# Patient Record
Sex: FEMALE | Race: WHITE | NOT HISPANIC OR LATINO | Employment: STUDENT | URBAN - METROPOLITAN AREA
[De-identification: names, ages, dates, MRNs, and addresses within clinical notes are randomized per-mention and may not be internally consistent; named-entity substitution may affect disease eponyms.]

---
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
## 2023-01-03 ENCOUNTER — TELEPHONE (OUTPATIENT)
Dept: NEUROLOGY | Facility: CLINIC | Age: 18
End: 2023-01-03

## 2023-01-03 DIAGNOSIS — E27.0 HYPERCORTISOLEMIA (H): Primary | ICD-10-CM

## 2023-01-03 NOTE — TELEPHONE ENCOUNTER
----- Message -----  From: Daisy Lucero MD  Sent: 1/2/2023  12:49 PM CST  To: Tory Rivera MD, #  Subject: IPSS referral                                    Happy new year Dr. Rivera and Mary Jo,      This is the case with 17 F, partially biochemical positive, and who rencently went to 7 Rach images.   I discussed with her, she and her family wants to pursue IPSS.    Thank you very much    Daisy

## 2023-01-04 NOTE — TELEPHONE ENCOUNTER
Spoke with patient. She would like to schedule procedure however she would like a consult with Dr. Rivera prior. Phone call with Dr. Rivera scheduled for 1/18/23 at 1:30. Will have scheduling reach out to arrange PSS. Patient verbalized understanding. Contact information provided and encouraged to call with questions/concerns. Mary Jo Magana RN 1/4/2023 1:30 PM

## 2023-01-05 ENCOUNTER — TELEPHONE (OUTPATIENT)
Dept: NEUROSURGERY | Facility: CLINIC | Age: 18
End: 2023-01-05

## 2023-01-05 NOTE — TELEPHONE ENCOUNTER
Called patient to schedule surgery with Dr. Rivera    Date of Surgery: 2/15    Location of surgery: [facility]    Pre-Op H&P: IV CONSCIOUS    Pre/Post Imaging:  No    Additional comments: Spoke with patient, they are aware of above dates, will reach out with any questions and await instruction from RN Anna C. Schoenecker on 1/5/2023 at 10:57 AM

## 2023-01-16 NOTE — TELEPHONE ENCOUNTER
[facility] Call Center    Phone Message    May a detailed message be left on voicemail: yes     Reason for Call: Other: Patient said she is supposed to have an appt with Dr. Rivera on 1/18. Writer does not see any appt scheduled.Please contact patient to advise.     Action Taken: Message routed to:  [facility] neurosurgery    Travel Screening: Not Applicable

## 2023-01-17 NOTE — TELEPHONE ENCOUNTER
FUTURE VISIT INFORMATION      FUTURE VISIT INFORMATION:    Date: 1/18/2023    Time: 130pm    Location: [facility]  REFERRAL INFORMATION:    Referring provider:  Dr. Lucero    Referring providers clinic:  [facility]    Reason for visit/diagnosis  Hypercortisolemia     RECORDS REQUESTED FROM:       Clinic name Comments Records Status Imaging Status   Internal Dr. Lcuero-11/11/2022    MR Pituitary-12/12/2022 Epic PACS

## 2023-01-18 ENCOUNTER — PRE VISIT (OUTPATIENT)
Dept: NEUROSURGERY | Facility: CLINIC | Age: 18
End: 2023-01-18

## 2023-01-18 ENCOUNTER — VIRTUAL VISIT (OUTPATIENT)
Dept: NEUROSURGERY | Facility: CLINIC | Age: 18
End: 2023-01-18
Payer: COMMERCIAL

## 2023-01-18 DIAGNOSIS — E27.0 HYPERCORTISOLEMIA (H): Primary | ICD-10-CM

## 2023-01-18 PROCEDURE — 99204 OFFICE O/P NEW MOD 45 MIN: CPT | Mod: 95 | Performed by: NEUROLOGICAL SURGERY

## 2023-01-18 ASSESSMENT — PATIENT HEALTH QUESTIONNAIRE - PHQ9: SUM OF ALL RESPONSES TO PHQ QUESTIONS 1-9: 12

## 2023-01-18 NOTE — Clinical Note
January 18, 2023       TO: Eloisa Rene  [address] [place] [facility] [address]       DearMs.,    We are writing to inform you of your test results.    {ump results letter list:[id number]}    No results found from the In Basket message.    ***

## 2023-01-18 NOTE — LETTER
1/18/2023      RE: Eloisa Rene  [address]  [place] [address]       Eloisa is a 17 year old who is being evaluated via a billable telephone visit.      What phone number would you like to be contacted at? [phone]   How would you like to obtain your AVS? [place]    Distant Location (provider location):  On-site  Phone call duration: 30 minutes    At [employer], mostly online. On the business school track. Brother has Down syndrome. Right handed.     Father was adopted; maternal grandfather with salivary gland cancer    Active until [employer] year of high school. Began with vertigo.  Urinary frequency in senior year.  First year of college - felt ill, malaise; April 2022 - noticeable symptoms    1. Tremulousness, diffuse, feels like she is vibrating inside  2. Rigors  3. Exercise intolerance - fatigue  4. Feels excessively hot  5. Drooping right side face, intermittent  6. Nearly 30 lb weight gain since April (increased truncal obesity with stretch marks)  7. Dysmenorrhea, chronic  8. Headaches, posterior neck, head is sensitive to touch, daily, radiates to temporal regions  9. Poor satiety  10. Multiple skin lesions that don't heal    See dictated note.    MD Tory Hunter MD

## 2023-01-18 NOTE — PROGRESS NOTES
Eloisa is a 17 year old who is being evaluated via a billable telephone visit.      What phone number would you like to be contacted at? [phone]   How would you like to obtain your AVS? Jigna    Distant Location (provider location):  On-site  Phone call duration: 30 minutes    At [employer], mostly online. On the business school track. Brother has Down syndrome. Right handed.     Father was adopted; maternal grandfather with salivary gland cancer    Active until [employer] year of high school. Began with vertigo.  Urinary frequency in senior year.  First year of college - felt ill, malaise; April 2022 - noticeable symptoms    1. Tremulousness, diffuse, feels like she is vibrating inside  2. Rigors  3. Exercise intolerance - fatigue  4. Feels excessively hot  5. Drooping right side face, intermittent  6. Nearly 30 lb weight gain since April (increased truncal obesity with stretch marks)  7. Dysmenorrhea, chronic  8. Headaches, posterior neck, head is sensitive to touch, daily, radiates to temporal regions  9. Poor satiety  10. Multiple skin lesions that don't heal    See dictated note.    MAGUI Rivera MD

## 2023-01-18 NOTE — PATIENT INSTRUCTIONS
"    Problem List Items Addressed This Visit        Musculoskeletal and Integumentary    Closed displaced fracture of proximal phalanx of lesser toe of left foot with routine healing     Fourth and fifth toe at the proximal phalanx.  Does work a job with a lot of walking, pushing and pulling.  Will continue work restrictions.  Continue to use hard soled shoe and jac taping.  Pain relief to the use of Tylenol and ibuprofen discussed.  Follow-up in 1 week for recheck and x-rays to determine healing given the mild displacement of the fourth toe fracture           Other Visit Diagnoses     Closed fracture of fourth toe of left foot with routine healing    -  Primary        Return in about 1 week (around 12/15/2021) for fracture follow up.    Subjective   Jordan is a 34 year old who presents for the following health issues   In her toes last week while playing with kids.  Not work-related.  However works a job in which he does a lot of walking, as well as lifting and bending and use of foot.  He thought after 3 or 4 days to build return to work however he is finding any walking causes pain.  Due to the nature of his job and how he does need to move quickly he does not feel he will be able to do his job successfully.  Reviewed previous x-rays.  Pain is staying under good control.  He is using a hard soled shoe and jac taping.  No swelling.  Bruising is still present but improving.    History of Present Illness       He eats 2-3 servings of fruits and vegetables daily.He consumes 1 sweetened beverage(s) daily.He exercises with enough effort to increase his heart rate 30 to 60 minutes per day.  He exercises with enough effort to increase his heart rate 4 days per week.   He is taking medications regularly.       Review of Systems   All other systems reviewed and are negative.           Objective    /88   Pulse 68   Temp 98.1  F (36.7  C) (Oral)   Resp 12   Ht 1.727 m (5' 8\")   Wt 103 kg (227 lb)   BMI 34.52 " Petrosal sinus sampling already scheduled   kg/m    Body mass index is 34.52 kg/m .  Physical Exam  Vitals and nursing note reviewed.   Constitutional:       General: He is not in acute distress.     Appearance: Normal appearance. He is not ill-appearing.   HENT:      Head: Normocephalic and atraumatic.   Eyes:      Extraocular Movements: Extraocular movements intact.      Conjunctiva/sclera: Conjunctivae normal.   Pulmonary:      Effort: Pulmonary effort is normal.   Skin:     Capillary Refill: Capillary refill takes less than 2 seconds.      Comments: Mild ecchymosis in the forefoot that is resolving.  Mild edema of the fourth and fifth toe.  Sensation intact.   Neurological:      Mental Status: He is alert and oriented to person, place, and time.   Psychiatric:         Attention and Perception: Attention normal.         Mood and Affect: Mood normal.         Speech: Speech normal.         Thought Content: Thought content normal.

## 2023-01-18 NOTE — LETTER
1/18/2023       RE: Eloisa Rene  [address]  [facility] [address]     Dear Colleague,    Thank you for referring your patient, Eloisa Rene, to the [facility] NEUROSURGERY CLINIC [place] at [facility]. Please see a copy of my visit note below.    Eloisa is a 17 year old who is being evaluated via a billable telephone visit.      What phone number would you like to be contacted at? [phone]   How would you like to obtain your AVS? MyChart    Distant Location (provider location):  On-site  Phone call duration: 30 minutes    At [employer] [facility], mostly online. On the business school track. Brother has Down syndrome. Right handed.     Father was adopted; maternal grandfather with salivary gland cancer    Active until [employer] year of high school. Began with vertigo.  Urinary frequency in senior year.  First year of college - felt ill, malaise; April 2022 - noticeable symptoms    1. Tremulousness, diffuse, feels like she is vibrating inside  2. Rigors  3. Exercise intolerance - fatigue  4. Feels excessively hot  5. Drooping right side face, intermittent  6. Nearly 30 lb weight gain since April (increased truncal obesity with stretch marks)  7. Dysmenorrhea, chronic  8. Headaches, posterior neck, head is sensitive to touch, daily, radiates to temporal regions  9. Poor satiety  10. Multiple skin lesions that don't heal    See dictated note.    MAGUI Rivera MD            Again, thank you for allowing me to participate in the care of your patient.      Sincerely,    Tory Rivera MD

## 2023-01-18 NOTE — LETTER
1/18/2023       RE: Eloisa Rene  [address]  [place] [address]     Dear Colleague,    Thank you for referring your patient, Eloisa Rene, to the [facility] NEUROSURGERY CLINIC [place] at [facility]. Please see a copy of my visit note below.    At [employer], mostly online. On the business school track. Brother has Down syndrome. Right handed.     Father was adopted; maternal grandfather with salivary gland cancer    Active until [employer] year of high school. Began with vertigo.  Urinary frequency in senior year.  First year of college - felt ill, malaise; April 2022 - noticeable symptoms    1. Tremulousness, diffuse, feels like she is vibrating inside  2. Rigors  3. Exercise intolerance - fatigue  4. Feels excessively hot  5. Drooping right side face, intermittent  6. Nearly 30 lb weight gain since April (increased truncal obesity with stretch marks)  7. Dysmenorrhea, chronic  8. Headaches, posterior neck, head is sensitive to touch, daily, radiates to temporal regions  9. Poor satiety  10. Multiple skin lesions that don't heal          Sincerely,    Tory Rivera MD

## 2023-01-23 ENCOUNTER — PATIENT OUTREACH (OUTPATIENT)
Dept: NEUROLOGY | Facility: CLINIC | Age: 18
End: 2023-01-23
Payer: COMMERCIAL

## 2023-01-23 NOTE — PATIENT INSTRUCTIONS
You are scheduled for a Diagnostic Cerebral Angiogram for Petrosal Sinus Sampling with Dr. Rivera on 2/15/23. Arrival Time: 6:30 AM. Procedure Time: 8:00 AM.      Please follow these instructions:     * Check in at the [facility] at the [facility]. The address is [address] [place] [address]. The phone number is [phone].      * Nothing to eat for 8 hours prior to arrival time. You may drink clear liquids (includes water, Jell-O, clear broth, apple juice or any non-carbonated beverage that you can see through) up until 2 hours prior to arrival time.       * You may take your medications with a sip of water the morning of the procedure.     * You will be discharged the same day. You must have a  home and someone that can stay with you through the night.     PLEASE NOTE our COVID-19 visitors policy: For the protection of our patients and visitors, patients are allowed two consistent visitors, 5 years of age or older, per adult patient in the hospital. Visitors must wear a mask at all times while in the hospital.     All discharge instructions will be given to the  or volunteer. Documentation for the post-operative plan will be given to the patient and . Patients are required to have someone to stay with them for 24 hours after their procedure.    If you have questions regarding your procedure, please contact me at [phone], option 1.    If you need to cancel, reschedule or have procedure scheduling related questions, please call Miranda at [phone].    Thank you,  Mary Jo Magana, RN, CNRN, SCRN  Stroke & Neuroendovascular Care Coordinator

## 2023-01-23 NOTE — PROGRESS NOTES
Informed patient of procedure instructions. Confirmed date and time. Patient verbalized understanding. All questions answered. Patient instructions sent via [employer]. Patient has my contact information and was encouraged to call with questions/concerns. Mary Jo Magana RN 1/23/2023 1:37 PM

## 2023-02-06 NOTE — PROGRESS NOTES
Service Date: 2023    Liane Smith DO  [address] [address]    RE:  Eloisa Rene  MRN: [id number]  : 2005    Dear Dr. Smith:      We spoke to Miss Rene as part of a telephone visit in [facility] on 2023.  At the request of our partner from Pituitary Endocrinology, Dr. Lucero.  I will refer you to her notes for additional details as well as the notes from Dr. Childs.  Dr. Childs has evaluated Miss Rene for multiple symptoms and identified hypercortisolemia.  She has undergone workup for Cushing disease.  Miss Rene was accompanied on this telephone visit by her parents.      In summary, she is a 17-year-old right-handed woman who has had multiple symptoms in recent years.  In retrospect, some symptoms began in the [employer] year of high school with vertigo.  She then developed urinary frequency in her senior year.  During her first year of college, she generally felt ill with malaise.  In 2022, she developed numerous discrete symptoms besides the generalized malaise.  She described many of the symptoms and these are outlined in Dr. Childs' and Dr. Lucero's notes.    Brief summary includes diffuse tremulousness (as if she is internally vibrating), exercise intolerance/fatigue, feeling excessively warm, intermittent right facial weakness (not observed by others), a 30-pound weight gain since 2022, truncal obesity and stretch marks, posterior cervical and occipital headaches that radiate to the bitemporal regions, poor satiety and multiple nonhealing skin lesions.  In addition, she has had chronic dysmenorrhea.      Some of her biochemical workup revealed elevated ACTH levels (178) 1 mg dexamethasone suppression test resulted in elevated cortisol.  She underwent the 8 mg suppression test, which appropriately suppressed at 0.61.  Her 24-hour urinary free cortisol was mildly elevated in 10/2022.  Her prolactin level is mildly elevated in the high 30s.    Her MRI  "has not shown an obvious pituitary tumor.    In summary, she has some findings that are suspicious for Cushing disease, but does not have fully conclusive biochemical profile.    PAST MEDICAL HISTORY:  Includes depression and anxiety.    FAMILY HISTORY:  Her maternal grandfather had salivary gland cancer.  Her father was adopted, so there is no known medical history on the paternal side.  Her brother has Down syndrome.    SOCIAL HISTORY:  She currently attends [employer].  She is completing mostly online course work and she is on the business school track.  She does not smoke or drink alcohol.      Over the phone, she sounded a bit anxious.  Otherwise, her speech, language and phonation are normal.    REVIEW OF STUDIES:  We went over her MRI from 12/12/2022.  This was performed on the 7 Rach unit.  There is a hypoenhancing focus on the right side of the sella adjacent to the parasellar space.  However, this is seen only on one slice and could very well represent a signal averaging from the surrounding sphenoid sinus or the parasellar space.  There are no discrete lesions seen in the sella.    IMPRESSION AND PLAN:  Given the inconclusive biochemical profile for her hypercortisolemia, we agree with Dr. Lucero that inferior petrosal sinus sampling is reasonable.      The majority of this 30-minute visit was spent discussing details of the procedure as well as defining the terms \"Cushing syndrome\" and \"Cushing disease\" also.  We went over the petrosal sinus sampling as well as the extremely small risks of groin hematomas, venous injury and stroke.  She seemed to have good understanding of all this and agrees to proceed.  We have her scheduled for the procedure in mid-February.  We will keep you informed of her progress.  Please do not hesitate to contact us with questions.    Sincerely,    Tory Rivera MD        D: 02/06/2023   T: 02/06/2023   MT: JAYASHREE    Name:     ELVA NELSON  MRN:      " [id number]        Account:      [id number]   :      2005           Service Date: 2023       Document: [id number]

## 2023-02-10 ENCOUNTER — TELEPHONE (OUTPATIENT)
Dept: NEUROSURGERY | Facility: CLINIC | Age: 18
End: 2023-02-10
Payer: COMMERCIAL

## 2023-02-10 NOTE — TELEPHONE ENCOUNTER
[facility] Call Center    Phone Message    May a detailed message be left on voicemail: yes     Reason for Call: Other: Patient called stating she has a question about her upcoming procedure 2/15/2023. Patient states she is menstruating for the next 4-5 days which she normally takes advil for, patient wonders how long it will need to be stopped for before her procedure to prevent an interaction with any of the other medications.    Action Taken: Message routed to:  [facility] Neurosurgery    Travel Screening: Not Applicable

## 2023-02-10 NOTE — TELEPHONE ENCOUNTER
Informed patient she does not need to discontinue ibuprofen for procedure. Patient verbalized understanding. Mary Jo Magana RN 2/10/2023 2:54 PM

## 2023-02-15 ENCOUNTER — APPOINTMENT (OUTPATIENT)
Dept: MEDSURG UNIT | Facility: CLINIC | Age: 18
End: 2023-02-15
Attending: NEUROLOGICAL SURGERY
Payer: COMMERCIAL

## 2023-02-15 ENCOUNTER — APPOINTMENT (OUTPATIENT)
Dept: INTERVENTIONAL RADIOLOGY/VASCULAR | Facility: CLINIC | Age: 18
End: 2023-02-15
Attending: NEUROLOGICAL SURGERY
Payer: COMMERCIAL

## 2023-02-15 ENCOUNTER — HOSPITAL ENCOUNTER (OUTPATIENT)
Facility: CLINIC | Age: 18
Discharge: HOME | End: 2023-02-15
Attending: NEUROLOGICAL SURGERY | Admitting: NEUROLOGICAL SURGERY
Payer: COMMERCIAL

## 2023-02-15 VITALS
HEART RATE: 96 BPM | OXYGEN SATURATION: 98 % | SYSTOLIC BLOOD PRESSURE: 105 MMHG | BODY MASS INDEX: 29.28 KG/M2 | DIASTOLIC BLOOD PRESSURE: 66 MMHG | TEMPERATURE: 98.5 F | RESPIRATION RATE: 14 BRPM | WEIGHT: 202.6 LBS

## 2023-02-15 DIAGNOSIS — E27.0 HYPERCORTISOLEMIA (H): ICD-10-CM

## 2023-02-15 LAB
ANION GAP SERPL CALCULATED.3IONS-SCNC: 14 MMOL/L (ref 7–15)
APTT PPP: 26 SECONDS (ref 22–38)
BUN SERPL-MCNC: 13.8 MG/DL (ref 6–20)
CALCIUM SERPL-MCNC: 9.1 MG/DL (ref 8.6–10)
CHLORIDE SERPL-SCNC: 106 MMOL/L (ref 98–107)
CREAT SERPL-MCNC: 0.61 MG/DL (ref 0.51–0.95)
DEPRECATED HCO3 PLAS-SCNC: 24 MMOL/L (ref 22–29)
ERYTHROCYTE [DISTWIDTH] IN BLOOD BY AUTOMATED COUNT: 12.9 % (ref 10–15)
GFR SERPL CREATININE-BSD FRML MDRD: >90 ML/MIN/1.73M2
GLUCOSE SERPL-MCNC: 109 MG/DL (ref 70–99)
HCG UR QL: NEGATIVE
HCT VFR BLD AUTO: 42.6 % (ref 35–47)
HGB BLD-MCNC: 13.6 G/DL (ref 11.7–15.7)
INR PPP: 1.02 (ref 0.85–1.15)
MCH RBC QN AUTO: 28.6 PG (ref 26.5–33)
MCHC RBC AUTO-ENTMCNC: 31.9 G/DL (ref 31.5–36.5)
MCV RBC AUTO: 90 FL (ref 78–100)
PLATELET # BLD AUTO: 183 10E3/UL (ref 150–450)
POTASSIUM SERPL-SCNC: 4.3 MMOL/L (ref 3.4–5.3)
RBC # BLD AUTO: 4.75 10E6/UL (ref 3.8–5.2)
SODIUM SERPL-SCNC: 144 MMOL/L (ref 136–145)
WBC # BLD AUTO: 6.1 10E3/UL (ref 4–11)

## 2023-02-15 PROCEDURE — 82947 ASSAY GLUCOSE BLOOD QUANT: CPT | Performed by: STUDENT IN AN ORGANIZED HEALTH CARE EDUCATION/TRAINING PROGRAM

## 2023-02-15 PROCEDURE — 85730 THROMBOPLASTIN TIME PARTIAL: CPT | Performed by: STUDENT IN AN ORGANIZED HEALTH CARE EDUCATION/TRAINING PROGRAM

## 2023-02-15 PROCEDURE — 36415 COLL VENOUS BLD VENIPUNCTURE: CPT | Performed by: STUDENT IN AN ORGANIZED HEALTH CARE EDUCATION/TRAINING PROGRAM

## 2023-02-15 PROCEDURE — 99152 MOD SED SAME PHYS/QHP 5/>YRS: CPT

## 2023-02-15 PROCEDURE — 999N000132 HC STATISTIC PP CARE STAGE 1

## 2023-02-15 PROCEDURE — 250N000011 HC RX IP 250 OP 636: Performed by: NEUROLOGICAL SURGERY

## 2023-02-15 PROCEDURE — 255N000002 HC RX 255 OP 636: Performed by: NEUROLOGICAL SURGERY

## 2023-02-15 PROCEDURE — 272N000506 HC NEEDLE CR6

## 2023-02-15 PROCEDURE — 999N000134 HC STATISTIC PP CARE STAGE 3

## 2023-02-15 PROCEDURE — C1769 GUIDE WIRE: HCPCS

## 2023-02-15 PROCEDURE — 36500 INSERTION OF CATHETER VEIN: CPT | Mod: GC | Performed by: NEUROLOGICAL SURGERY

## 2023-02-15 PROCEDURE — C1887 CATHETER, GUIDING: HCPCS

## 2023-02-15 PROCEDURE — 36500 INSERTION OF CATHETER VEIN: CPT | Mod: LT

## 2023-02-15 PROCEDURE — 36012 PLACE CATHETER IN VEIN: CPT | Mod: 50

## 2023-02-15 PROCEDURE — 81025 URINE PREGNANCY TEST: CPT | Performed by: NEUROLOGICAL SURGERY

## 2023-02-15 PROCEDURE — 272N000197 HC ACCESSORY CR6

## 2023-02-15 PROCEDURE — 85610 PROTHROMBIN TIME: CPT | Performed by: STUDENT IN AN ORGANIZED HEALTH CARE EDUCATION/TRAINING PROGRAM

## 2023-02-15 PROCEDURE — 272N000566 HC SHEATH CR3

## 2023-02-15 PROCEDURE — 82024 ASSAY OF ACTH: CPT | Performed by: STUDENT IN AN ORGANIZED HEALTH CARE EDUCATION/TRAINING PROGRAM

## 2023-02-15 PROCEDURE — 75893 VENOUS SAMPLING BY CATHETER: CPT | Mod: 26 | Performed by: NEUROLOGICAL SURGERY

## 2023-02-15 PROCEDURE — 272N000192 HC ACCESSORY CR2

## 2023-02-15 PROCEDURE — 250N000009 HC RX 250: Performed by: STUDENT IN AN ORGANIZED HEALTH CARE EDUCATION/TRAINING PROGRAM

## 2023-02-15 PROCEDURE — 85027 COMPLETE CBC AUTOMATED: CPT | Performed by: STUDENT IN AN ORGANIZED HEALTH CARE EDUCATION/TRAINING PROGRAM

## 2023-02-15 PROCEDURE — 75893 VENOUS SAMPLING BY CATHETER: CPT | Mod: LT

## 2023-02-15 PROCEDURE — 36500 INSERTION OF CATHETER VEIN: CPT | Mod: XS | Performed by: NEUROLOGICAL SURGERY

## 2023-02-15 PROCEDURE — C1760 CLOSURE DEV, VASC: HCPCS

## 2023-02-15 PROCEDURE — 250N000011 HC RX IP 250 OP 636: Performed by: STUDENT IN AN ORGANIZED HEALTH CARE EDUCATION/TRAINING PROGRAM

## 2023-02-15 PROCEDURE — 36500 INSERTION OF CATHETER VEIN: CPT | Mod: RT

## 2023-02-15 PROCEDURE — 99152 MOD SED SAME PHYS/QHP 5/>YRS: CPT | Mod: GC | Performed by: NEUROLOGICAL SURGERY

## 2023-02-15 PROCEDURE — 258N000003 HC RX IP 258 OP 636: Performed by: STUDENT IN AN ORGANIZED HEALTH CARE EDUCATION/TRAINING PROGRAM

## 2023-02-15 RX ORDER — LIDOCAINE 40 MG/G
CREAM TOPICAL
Status: DISCONTINUED | OUTPATIENT
Start: 2023-02-15 | End: 2023-02-15 | Stop reason: HOSPADM

## 2023-02-15 RX ORDER — ONDANSETRON 4 MG/1
4 TABLET, ORALLY DISINTEGRATING ORAL EVERY 6 HOURS PRN
Status: DISCONTINUED | OUTPATIENT
Start: 2023-02-15 | End: 2023-02-15 | Stop reason: HOSPADM

## 2023-02-15 RX ORDER — SODIUM CHLORIDE 9 MG/ML
INJECTION, SOLUTION INTRAVENOUS CONTINUOUS
Status: DISCONTINUED | OUTPATIENT
Start: 2023-02-15 | End: 2023-02-15 | Stop reason: HOSPADM

## 2023-02-15 RX ORDER — FENTANYL CITRATE 50 UG/ML
25-50 INJECTION, SOLUTION INTRAMUSCULAR; INTRAVENOUS EVERY 5 MIN PRN
Status: DISCONTINUED | OUTPATIENT
Start: 2023-02-15 | End: 2023-02-15 | Stop reason: HOSPADM

## 2023-02-15 RX ORDER — HEPARIN SODIUM 200 [USP'U]/100ML
1 INJECTION, SOLUTION INTRAVENOUS CONTINUOUS PRN
Status: DISCONTINUED | OUTPATIENT
Start: 2023-02-15 | End: 2023-02-15 | Stop reason: HOSPADM

## 2023-02-15 RX ORDER — IBUPROFEN 200 MG
600 TABLET ORAL EVERY 6 HOURS PRN
COMMUNITY

## 2023-02-15 RX ORDER — FLUMAZENIL 0.1 MG/ML
0.2 INJECTION, SOLUTION INTRAVENOUS
Status: DISCONTINUED | OUTPATIENT
Start: 2023-02-15 | End: 2023-02-15 | Stop reason: HOSPADM

## 2023-02-15 RX ORDER — ONDANSETRON 2 MG/ML
4 INJECTION INTRAMUSCULAR; INTRAVENOUS EVERY 6 HOURS PRN
Status: DISCONTINUED | OUTPATIENT
Start: 2023-02-15 | End: 2023-02-15 | Stop reason: HOSPADM

## 2023-02-15 RX ORDER — HEPARIN SODIUM 1000 [USP'U]/ML
8400 INJECTION, SOLUTION INTRAVENOUS; SUBCUTANEOUS
Status: COMPLETED | OUTPATIENT
Start: 2023-02-15 | End: 2023-02-15

## 2023-02-15 RX ORDER — PROCHLORPERAZINE 25 MG
25 SUPPOSITORY, RECTAL RECTAL EVERY 12 HOURS PRN
Status: DISCONTINUED | OUTPATIENT
Start: 2023-02-15 | End: 2023-02-15 | Stop reason: HOSPADM

## 2023-02-15 RX ORDER — NALOXONE HYDROCHLORIDE 0.4 MG/ML
0.2 INJECTION, SOLUTION INTRAMUSCULAR; INTRAVENOUS; SUBCUTANEOUS
Status: DISCONTINUED | OUTPATIENT
Start: 2023-02-15 | End: 2023-02-15 | Stop reason: HOSPADM

## 2023-02-15 RX ORDER — HEPARIN SODIUM 1000 [USP'U]/ML
500-6000 INJECTION, SOLUTION INTRAVENOUS; SUBCUTANEOUS
Status: COMPLETED | OUTPATIENT
Start: 2023-02-15 | End: 2023-02-15

## 2023-02-15 RX ORDER — NALOXONE HYDROCHLORIDE 0.4 MG/ML
0.4 INJECTION, SOLUTION INTRAMUSCULAR; INTRAVENOUS; SUBCUTANEOUS
Status: DISCONTINUED | OUTPATIENT
Start: 2023-02-15 | End: 2023-02-15 | Stop reason: HOSPADM

## 2023-02-15 RX ORDER — IODIXANOL 320 MG/ML
100 INJECTION, SOLUTION INTRAVASCULAR ONCE
Status: COMPLETED | OUTPATIENT
Start: 2023-02-15 | End: 2023-02-15

## 2023-02-15 RX ORDER — PROCHLORPERAZINE MALEATE 10 MG
10 TABLET ORAL EVERY 6 HOURS PRN
Status: DISCONTINUED | OUTPATIENT
Start: 2023-02-15 | End: 2023-02-15 | Stop reason: HOSPADM

## 2023-02-15 RX ORDER — DESMOPRESSIN ACETATE 4 UG/ML
10 INJECTION, SOLUTION INTRAVENOUS; SUBCUTANEOUS ONCE
Status: COMPLETED | OUTPATIENT
Start: 2023-02-15 | End: 2023-02-15

## 2023-02-15 RX ORDER — PROTAMINE SULFATE 10 MG/ML
25 INJECTION, SOLUTION INTRAVENOUS ONCE
Status: COMPLETED | OUTPATIENT
Start: 2023-02-15 | End: 2023-02-15

## 2023-02-15 RX ADMIN — MIDAZOLAM HYDROCHLORIDE 0.5 MG: 1 INJECTION, SOLUTION INTRAMUSCULAR; INTRAVENOUS at 10:02

## 2023-02-15 RX ADMIN — FENTANYL CITRATE 25 MCG: 50 INJECTION, SOLUTION INTRAMUSCULAR; INTRAVENOUS at 11:01

## 2023-02-15 RX ADMIN — FENTANYL CITRATE 50 MCG: 50 INJECTION, SOLUTION INTRAMUSCULAR; INTRAVENOUS at 09:27

## 2023-02-15 RX ADMIN — HEPARIN SODIUM 5 BAG: 200 INJECTION, SOLUTION INTRAVENOUS at 09:40

## 2023-02-15 RX ADMIN — MIDAZOLAM HYDROCHLORIDE 1 MG: 1 INJECTION, SOLUTION INTRAMUSCULAR; INTRAVENOUS at 09:27

## 2023-02-15 RX ADMIN — FENTANYL CITRATE 25 MCG: 50 INJECTION, SOLUTION INTRAMUSCULAR; INTRAVENOUS at 10:26

## 2023-02-15 RX ADMIN — FENTANYL CITRATE 50 MCG: 50 INJECTION, SOLUTION INTRAMUSCULAR; INTRAVENOUS at 09:50

## 2023-02-15 RX ADMIN — FENTANYL CITRATE 25 MCG: 50 INJECTION, SOLUTION INTRAMUSCULAR; INTRAVENOUS at 09:36

## 2023-02-15 RX ADMIN — FENTANYL CITRATE 25 MCG: 50 INJECTION, SOLUTION INTRAMUSCULAR; INTRAVENOUS at 11:22

## 2023-02-15 RX ADMIN — FENTANYL CITRATE 25 MCG: 50 INJECTION, SOLUTION INTRAMUSCULAR; INTRAVENOUS at 10:38

## 2023-02-15 RX ADMIN — MIDAZOLAM HYDROCHLORIDE 0.5 MG: 1 INJECTION, SOLUTION INTRAMUSCULAR; INTRAVENOUS at 09:36

## 2023-02-15 RX ADMIN — FENTANYL CITRATE 25 MCG: 50 INJECTION, SOLUTION INTRAMUSCULAR; INTRAVENOUS at 09:44

## 2023-02-15 RX ADMIN — DESMOPRESSIN ACETATE 10 MCG: 4 INJECTION, SOLUTION INTRAVENOUS; SUBCUTANEOUS at 10:58

## 2023-02-15 RX ADMIN — MIDAZOLAM HYDROCHLORIDE 0.5 MG: 1 INJECTION, SOLUTION INTRAMUSCULAR; INTRAVENOUS at 10:26

## 2023-02-15 RX ADMIN — MIDAZOLAM HYDROCHLORIDE 0.5 MG: 1 INJECTION, SOLUTION INTRAMUSCULAR; INTRAVENOUS at 11:22

## 2023-02-15 RX ADMIN — PROTAMINE SULFATE 25 MG: 10 INJECTION, SOLUTION INTRAVENOUS at 11:34

## 2023-02-15 RX ADMIN — SODIUM CHLORIDE: 9 INJECTION, SOLUTION INTRAVENOUS at 07:32

## 2023-02-15 RX ADMIN — MIDAZOLAM HYDROCHLORIDE 0.5 MG: 1 INJECTION, SOLUTION INTRAMUSCULAR; INTRAVENOUS at 09:44

## 2023-02-15 RX ADMIN — FENTANYL CITRATE 25 MCG: 50 INJECTION, SOLUTION INTRAMUSCULAR; INTRAVENOUS at 10:13

## 2023-02-15 RX ADMIN — FENTANYL CITRATE 25 MCG: 50 INJECTION, SOLUTION INTRAMUSCULAR; INTRAVENOUS at 10:01

## 2023-02-15 RX ADMIN — LIDOCAINE HYDROCHLORIDE 35 ML: 10 INJECTION, SOLUTION EPIDURAL; INFILTRATION; INTRACAUDAL; PERINEURAL at 09:46

## 2023-02-15 RX ADMIN — HEPARIN SODIUM 8400 UNITS: 1000 INJECTION INTRAVENOUS; SUBCUTANEOUS at 10:11

## 2023-02-15 RX ADMIN — MIDAZOLAM HYDROCHLORIDE 0.5 MG: 1 INJECTION, SOLUTION INTRAMUSCULAR; INTRAVENOUS at 10:13

## 2023-02-15 RX ADMIN — MIDAZOLAM HYDROCHLORIDE 0.5 MG: 1 INJECTION, SOLUTION INTRAMUSCULAR; INTRAVENOUS at 10:39

## 2023-02-15 RX ADMIN — HEPARIN SODIUM 500 UNITS: 1000 INJECTION INTRAVENOUS; SUBCUTANEOUS at 11:11

## 2023-02-15 RX ADMIN — MIDAZOLAM HYDROCHLORIDE 1 MG: 1 INJECTION, SOLUTION INTRAMUSCULAR; INTRAVENOUS at 09:50

## 2023-02-15 RX ADMIN — MIDAZOLAM HYDROCHLORIDE 0.5 MG: 1 INJECTION, SOLUTION INTRAMUSCULAR; INTRAVENOUS at 11:01

## 2023-02-15 RX ADMIN — IODIXANOL 65 ML: 320 INJECTION, SOLUTION INTRAVASCULAR at 11:37

## 2023-02-15 ASSESSMENT — ACTIVITIES OF DAILY LIVING (ADL)
ADLS_ACUITY_SCORE: 35

## 2023-02-15 ASSESSMENT — VISUAL ACUITY
OU: NORMAL ACUITY
OU: NORMAL ACUITY

## 2023-02-15 NOTE — PROGRESS NOTES
[facility]     Endovascular Surgical Neuroradiology Pre-Procedure Note      HPI:  Eloisa Rene is a 17 year old Right Handed female with suspected Cushing syndrome presenting for further evaluation through Inferior Petrosal Sinus Sampling. MRI has not shown an obvious pituitary adenoma. However, some of the biochemical testing was equivocal. Including low and high dose dexamethasone supression testing.     She has a normal neurological examination. Denies any new complaints.     Medical History:  History reviewed. No pertinent past medical history.    Surgical History:  History reviewed. No pertinent surgical history.    Family History:  Family History   Problem Relation Age of Onset     Congenital Anomalies Brother         Down's Syndrome       Social History:  Social History     Socioeconomic History     Marital status: Single     Spouse name: Not on file     Number of children: Not on file     Years of education: Not on file     Highest education level: Not on file   Occupational History     Not on file   Tobacco Use     Smoking status: Never     Smokeless tobacco: Never   Substance and Sexual Activity     Alcohol use: Not on file     Drug use: Not on file     Sexual activity: Not on file   Other Topics Concern     Not on file   Social History Narrative     Not on file     Social Determinants of Health     Financial Resource Strain: Not on file   Food Insecurity: Not on file   Transportation Needs: Not on file   Physical Activity: Not on file   Stress: Not on file   Social Connections: Not on file   Intimate Partner Violence: Not on file   Housing Stability: Not on file       Allergies:  Allergies   Allergen Reactions     Adderall Other (See Comments)     She did not feel well while taking      Apri Unknown     Fluoxetine Other (See Comments)     Suicidal thoughts      Gianvi Other (See Comments)     Suicidal thoughts        Is there a contrast allergy?   No    Medications:  Medications Prior to Admission   Medication Sig Dispense Refill Last Dose     diazepam (VALIUM) 5 MG tablet Take 1 tablet (5 mg) by mouth once as needed for anxiety Take 1 tablet before MRI 2 tablet 1 More than a month     ibuprofen (ADVIL/MOTRIN) 200 MG tablet Take 600 mg by mouth every 6 hours as needed for pain   2/13/2023     VITAMIN D PO Take 1 tablet by mouth daily as needed   More than a month   .    ROS:  The 10 point Review of Systems is negative other than noted in the HPI or here.     PHYSICAL EXAMINATION  Vital Signs:  B/P: 126/85,  T: 98.5,  P: 94,  R: 16    Cardio:  RRR  Pulmonary:  no respiratory distress  Abdomen:  soft, non-tender    Neurologic  Mental Status:  fully alert, attentive and oriented, follows commands, speech clear and fluent  Cranial Nerves:  visual fields intact, PERRL, EOMI with normal smooth pursuit, facial sensation intact and symmetric, facial movements symmetric, hearing not formally tested but intact to conversation, palate elevation symmetric and uvula midline, no dysarthria, shoulder shrug strong bilaterally, tongue protrusion midline  Motor:  no abnormal movements, normal tone throughout, normal muscle bulk, no pronator drift, normal and symmetric rapid finger tapping, able to move all limbs spontaneously, strength 5/5 throughout upper and lower extremities  Sensory:  intact/symmetric to light touch and pin prick throughout upper and lower extremities  Coordination:  FNF and HS intact without dysmetria    Pre-procedure National Institutes of Health Stroke Scale:   Not applicable    LABS  (most recent Cr, BUN, GFR, PLT, INR, PTT within the past 7 days):  Recent Labs   Lab 02/15/23  0714   CR 0.61   BUN 13.8   GFRESTIMATED >90      INR 1.02   PTT 26        Platelet Function P2Y12 (PRU):  Not applicable      ASSESSMENT:    16 yo F with suspected Cushing's Syndrome presenting for IPSS     PLAN:  - IPSS      PRE-PROCEDURE SEDATION ASSESSMENT      Pre-Procedure Sedation Assessment done at 2100.    Expected Level:  Moderate Sedation    Indication:  Sedation is required to allow for neurointerventional procedure.    Consent obtained from patient after discussing the risks, benefits and alternatives.     PO Intake:  Appropriately NPO for procedure    ASA Class:  Class 1 - HEALTHY PATIENT    Mallampati:  Grade 1:  Soft palate, uvula, tonsillar pillars, and posterior pharyngeal wall visible    History and physical reviewed and no updates needed. I have reviewed the lab findings, diagnostic data, medications, and the plan for sedation. I have determined this patient to be an appropriate candidate for the planned sedation and procedure and have reassessed the patient IMMEDIATELY PRIOR to sedation and procedure.    Patient was discussed with the Attending, Dr. Rivera, who agrees with the plan.    Zachary Pagan MD, MPH  Neuroendovascular Surgery Fellow  [facility]  Pager: [phone]

## 2023-02-15 NOTE — IR NOTE
Patient Name: Eloisa Rene  Medical Record Number: [id number]  Today's Date: 2/15/2023    Procedure: inferior petrosal sinus sampling  Proceduralist: MD Pagan, MD Lopez, MD Rivera  Pathology present: N/A    Procedure Start: 0943  Procedure end: 1146  Sedation medications administered: 6 mg of versed, 300 mcg of fentanyl  Sedation time:  141 minutes (0094-7625)  Other: 8,400 units of IV heparin (1011), 10 mcg of IV desmopressin (1058), 500 units of IV heparin (1111), 25 mg of IV protamine sulfate (1134)    Report given to: Vanesa KAUFMAN RN  : N/A    Right groin site accessed at 0958; Right groin site de-accessed at 1140 using manual pressure for closure. Left groin site accessed at 1004. Left groin site de-accessed at 1138 using ExoSeal for closure - flat bedrest x 2 hours, till 1345.     Other Notes: Pt arrived to IR room 03 from . Consent reviewed. Pt denies any questions or concerns regarding procedure. Pt positioned supine and monitored per protocol. Pt tolerated procedure without any noted complications. Pt transferred back to .

## 2023-02-15 NOTE — DISCHARGE INSTRUCTIONS
[facility]         Interventional Radiology  Discharge Instructions Post Angiogram (NEURO)    AFTER YOU GO HOME  If you were given sedation, for the first 24 hours: we recommend that an adult stay with you, DO NOT drive or operate machinery at home or at work, DO NOT smoke, DO NOT make any important or legal decisions.  DO NOT drink alcoholic beverages the day of your procedure  DO relax and take it easy for 24 hours  DO drink plenty of fluids  DO resume your regular diet, unless otherwise instructed by your Primary Physician  DO NOT take a shower for at least 12 hours following your procedure. No tub bath, hot tubs, or swimming for 5 days. Do NOT scrub the procedure site vigorously for 5 days.      Care of groin site  It is normal to have a small bruise or lump at the site.  For the first 2 days, when you cough, sneeze or move your bowels, hold your hand over the puncture site and press gently.  Do NOT lift more than 10 pounds or do any strenuous exercise for at least 3 to 5 days.  Do not use lotion or powder near the puncture site for 3 days.  If you start bleeding from the site in your groin: lie down flat and press firmly on the site. Call your doctor as soon as you can.   Call 911 right away if you have: Heavy bleeding, bleeding that does not stop.    CALL THE PHYSICIAN IF:  You start bleeding from the procedure site.  You have numbness, coolness or change in color of the arm or leg of the puncture site  You experience changes in your vision, hearing, balance, coordination, speech, thinking or memory  You experience weakness in one or more extremity  You experience pain or redness at the puncture site  You develop nausea or vomiting  You develop hives or a rash or unexplained itching  You develop a temperature of 101 degrees F or greater      Stroke - Call 911    Remember: BE FAST       BALANCE--Sudden loss of balance or coordination. Example: trouble walking     EYES--Sudden problem seeing  out of one or both eyes     FACE--Sudden numbness or change to one side of the face. Examples: facial droop, uneven smile     ARM--Sudden numbness or weakness in one arm or leg     SPEECH--Sudden changes in speech or talking that doesn t make sense     TIME--if the person is having any of the symptoms above, CALL 911 immediately.      Symptoms may go away, then come back.     Sudden, worst headache of your life      [facility] INTERVENTIONAL RADIOLOGY DEPARTMENT  Procedure Physician:  Dr. Rivera, Dr. Pagan, and Dr. Lopez  Date of procedure: February 15, 2023  Telephone Numbers: [phone]      Monday-Friday 7:30 am to 4:00 pm  [phone] After 4:00 pm Monday-Friday, Weekends & Holidays.   Ask for the Neuro-Interventional doctor on call.  Someone is on call 24 hrs/day  [facility] toll free number: [phone] Monday-Friday 8:00 am to 4:30 pm  [facility] Emergency Dept: [phone]

## 2023-02-15 NOTE — PROGRESS NOTES
"Arrived to Unit [address] for cerebral angiogram procedure with petrosal sinus sampling done by Neuro[facility]. Tachycardic intially; stated feeling \"nervous.\" HR decreased to 90s. Denies pain. States feeling \"internal tremors.\" Body temperature hot; slightly diaphoretic all over. Pt states feeling very hot, requesting ice packs over entire body. No other neuro deficits. Labs resulting. DadRoyce, at bedside #[phone]. Pt stable; ready for consent.   "

## 2023-02-15 NOTE — DISCHARGE SUMMARY
Pt discharged to home. VSS on RA. Right and left groin access sites C/D/I, negative for a hematoma. Up ambulating and voiding w/o difficulty. PIV access removed. Tolerating PO intake. Discharge instructions reviewed and all questions answered. Pt escorted to the front entrance via wheelchair accompanied by RN and dad.

## 2023-02-15 NOTE — PROCEDURES
[facility]     Endovascular Surgical Neuroradiology Post-Procedure Note    Pre-Procedure Diagnosis:  Suspected cushings sydrome   Post-Procedure Diagnosis: Successful IPSS    Procedure(s):   Inferior petrosal sinus sampling    Findings:  As above     Plan:    [] Keep both legs flat x 3 hours       Primary Surgeon:  Dr. Tory Rivera  Secondary Surgeon:  Not applicable  Secondary Surgeon Review:  None  Fellow:  Latasha Lopez   Additional Assistants:  None     Prior to the start of the procedure and with procedural staff participation, I verbally confirmed: the patient s identity using two indicators, relevant allergies, that the procedure was appropriate and matched the consent or emergent situation, and that the correct equipment/implants were available. Immediately prior to starting the procedure I conducted the Time Out with the procedural staff and re-confirmed the patient s name, procedure, and site/side. (The Joint Commission universal protocol was followed.)  Yes    PRU value: Not applicable    Anesthesia:  Conscious Sedation  Medications:  Fentanyl, Midazolam  Puncture site:  RIGHT LEFT FEMORAL VEINS     Fluoroscopy time (minutes):  25.2  Radiation dose (mGy):  907.8    Contrast amount (mL):  40     Estimated blood loss (mL):  10    Closure:  Device    Disposition:  Home after recovery.        Sedation Post-Procedure Summary    Sedatives: Fentanyl and Midazolam (Versed)    Vital signs and pulse oximetry were monitored and remained stable throughout the procedure, and sedation was maintained until the procedure was complete.  The patient was monitored by staff until sedation discharge criteria were met.    Patient tolerance:  Patient tolerated the procedure well with no immediate complications.    Time of sedation in minutes:  60 minutes from beginning to end of physician one to one monitoring.    CHAVEZ LOPEZ MD  Pager:  [phone]

## 2023-02-15 NOTE — PROGRESS NOTES
Pt arrived back to 2A from procedure. VSS on RA, denies pain. Left groin site with exoseal in place and right groin with Tegaderm (manual pressure was held in IR). Tolerating water intake, denies food at this time. Pedal pulses are palpable. Two hour bedrest time; off bedrest at 1345.

## 2023-02-16 LAB
ACTH PLAS-MCNC: 10 PG/ML
ACTH PLAS-MCNC: 10 PG/ML
ACTH PLAS-MCNC: 11 PG/ML
ACTH PLAS-MCNC: 12 PG/ML
ACTH PLAS-MCNC: 13 PG/ML
ACTH PLAS-MCNC: 14 PG/ML
ACTH PLAS-MCNC: 15 PG/ML
ACTH PLAS-MCNC: 15 PG/ML
ACTH PLAS-MCNC: 22 PG/ML

## 2023-02-20 ENCOUNTER — TELEPHONE (OUTPATIENT)
Dept: NEUROSURGERY | Facility: CLINIC | Age: 18
End: 2023-02-20
Payer: COMMERCIAL

## 2023-02-21 NOTE — TELEPHONE ENCOUNTER
I spoke to Ms. King hayden and conveyed results of recent inferior petrosal sinus sampling (IPSS). She tolerated the procedure well overall. She has some parasthesias in the groin and lower extremities in a patchy distribution. No obvious hematoma or ecchymosis.     The IPSS shows convincingly that this patient does not have Cushing's disease. All ACTH values were well within reference range.    I explained that she will not need pituitary surgery and that continued medical management and workup is appropriate. Dr. Lucero will discuss next steps with her in the near future.    I asked her to contact us if the lower extremity symptoms do not improve.     MAGUI Rivera MD

## 2023-03-07 ENCOUNTER — LAB (OUTPATIENT)
Dept: LAB | Facility: CLINIC | Age: 18
End: 2023-03-07
Payer: COMMERCIAL

## 2023-03-07 DIAGNOSIS — E27.0 HYPERCORTISOLEMIA (H): ICD-10-CM

## 2023-03-07 LAB
SHBG SERPL-SCNC: 14 NMOL/L (ref 30–135)
SHBG SERPL-SCNC: 15 NMOL/L (ref 30–135)

## 2023-03-07 PROCEDURE — 84270 ASSAY OF SEX HORMONE GLOBUL: CPT

## 2023-03-07 PROCEDURE — 36415 COLL VENOUS BLD VENIPUNCTURE: CPT

## 2023-03-07 PROCEDURE — 84403 ASSAY OF TOTAL TESTOSTERONE: CPT

## 2023-03-07 PROCEDURE — 82627 DEHYDROEPIANDROSTERONE: CPT

## 2023-03-08 ENCOUNTER — TRANSFERRED RECORDS (OUTPATIENT)
Dept: HEALTH INFORMATION MANAGEMENT | Facility: CLINIC | Age: 18
End: 2023-03-08

## 2023-03-08 LAB — DHEA-S SERPL-MCNC: 188 UG/DL (ref 35–430)

## 2023-03-09 LAB
TESTOST FREE SERPL-MCNC: 0.79 NG/DL
TESTOST SERPL-MCNC: 30 NG/DL (ref 8–60)

## 2023-03-18 NOTE — RESULT ENCOUNTER NOTE
Dear Eloisa     Here is your results. I am sorry for the delay.   Your testosterone is broderline (normal but higher side), SHBG is low, meaning that we have some space to work on metabolic factor,   Suggestion is metformin 500 mg twice a day, Actos 15 mg daily, and spironolactone 50 mg twice a day. Could you let me know if you are interested in trying these medications.     Please call nursing line, if you are [facility] patient at [phone], if you are [facility] patient at [phone],  if you have any questions.    Please take care  Daisy Lucero MD

## 2023-03-21 ASSESSMENT — ENCOUNTER SYMPTOMS
BLOOD IN STOOL: 0
HYPOTENSION: 0
ABDOMINAL PAIN: 0
SYNCOPE: 0
SINUS CONGESTION: 1
NERVOUS/ANXIOUS: 1
JAUNDICE: 0
BACK PAIN: 1
DYSURIA: 1
INCREASED ENERGY: 1
DECREASED APPETITE: 0
SEIZURES: 0
ALTERED TEMPERATURE REGULATION: 1
EXERCISE INTOLERANCE: 1
FEVER: 0
COUGH: 0
EYE IRRITATION: 1
NAUSEA: 1
EYE PAIN: 0
PARALYSIS: 0
SMELL DISTURBANCE: 1
SKIN CHANGES: 1
HEARTBURN: 1
SPEECH CHANGE: 0
TASTE DISTURBANCE: 0
INSOMNIA: 1
POLYPHAGIA: 1
DIZZINESS: 1
WEAKNESS: 1
BOWEL INCONTINENCE: 0
DOUBLE VISION: 0
RECTAL PAIN: 0
HALLUCINATIONS: 0
DYSPNEA ON EXERTION: 1
MYALGIAS: 1
FLANK PAIN: 0
STIFFNESS: 1
NIGHT SWEATS: 1
MUSCLE CRAMPS: 1
SNORES LOUDLY: 0
NAIL CHANGES: 1
WEIGHT GAIN: 1
BRUISES/BLEEDS EASILY: 0
POSTURAL DYSPNEA: 1
SORE THROAT: 0
DIFFICULTY URINATING: 1
EYE REDNESS: 0
DECREASED LIBIDO: 1
DEPRESSION: 0
VOMITING: 0
NECK MASS: 0
NUMBNESS: 0
DISTURBANCES IN COORDINATION: 0
SWOLLEN GLANDS: 0
FATIGUE: 1
POLYDIPSIA: 0
HEMOPTYSIS: 0
EYE WATERING: 0
SLEEP DISTURBANCES DUE TO BREATHING: 0
HOARSE VOICE: 0
SHORTNESS OF BREATH: 1
DECREASED CONCENTRATION: 1
LOSS OF CONSCIOUSNESS: 0
MUSCLE WEAKNESS: 1
ORTHOPNEA: 1
WEIGHT LOSS: 0
HYPERTENSION: 0
PALPITATIONS: 1
NECK PAIN: 1
COUGH DISTURBING SLEEP: 0
LIGHT-HEADEDNESS: 1
TROUBLE SWALLOWING: 0
WHEEZING: 0
DIARRHEA: 0
TREMORS: 1
PANIC: 0
CONSTIPATION: 0
SINUS PAIN: 0
SPUTUM PRODUCTION: 0
POOR WOUND HEALING: 1
MEMORY LOSS: 0
CHILLS: 1
TINGLING: 1
JOINT SWELLING: 0
HEADACHES: 1
BLOATING: 1
HEMATURIA: 0
ARTHRALGIAS: 1
LEG PAIN: 1
HOT FLASHES: 1

## 2023-03-23 ENCOUNTER — TRANSFERRED RECORDS (OUTPATIENT)
Dept: HEALTH INFORMATION MANAGEMENT | Facility: CLINIC | Age: 18
End: 2023-03-23

## 2023-03-24 ENCOUNTER — OFFICE VISIT (OUTPATIENT)
Dept: ENDOCRINOLOGY | Facility: CLINIC | Age: 18
End: 2023-03-24
Payer: COMMERCIAL

## 2023-03-24 VITALS
BODY MASS INDEX: 29.33 KG/M2 | SYSTOLIC BLOOD PRESSURE: 144 MMHG | HEART RATE: 124 BPM | OXYGEN SATURATION: 100 % | WEIGHT: 204.9 LBS | HEIGHT: 70 IN | DIASTOLIC BLOOD PRESSURE: 85 MMHG | RESPIRATION RATE: 20 BRPM

## 2023-03-24 DIAGNOSIS — E24.0 PITUITARY DEPENDENT CUSHING DISEASE (H): ICD-10-CM

## 2023-03-24 DIAGNOSIS — D3A.8 NEUROENDOCRINE NEOPLASM OF LUNG (H): ICD-10-CM

## 2023-03-24 DIAGNOSIS — E27.0 HYPERCORTISOLEMIA (H): Primary | ICD-10-CM

## 2023-03-24 LAB
ALBUMIN SERPL-MCNC: 4.2 G/DL (ref 3.4–5)
ALP SERPL-CCNC: 124 U/L (ref 40–150)
ALT SERPL W P-5'-P-CCNC: 24 U/L (ref 0–50)
ANION GAP SERPL CALCULATED.3IONS-SCNC: 1 MMOL/L (ref 3–14)
AST SERPL W P-5'-P-CCNC: 14 U/L (ref 0–35)
BILIRUB SERPL-MCNC: 0.4 MG/DL (ref 0.2–1.3)
BUN SERPL-MCNC: 13 MG/DL (ref 7–30)
CALCIUM SERPL-MCNC: 9.5 MG/DL (ref 8.5–10.1)
CHLORIDE BLD-SCNC: 108 MMOL/L (ref 96–110)
CO2 SERPL-SCNC: 31 MMOL/L (ref 20–32)
CREAT SERPL-MCNC: 0.61 MG/DL (ref 0.5–1)
GFR SERPL CREATININE-BSD FRML MDRD: >90 ML/MIN/1.73M2
GLUCOSE BLD-MCNC: 103 MG/DL (ref 70–99)
PHOSPHATE SERPL-MCNC: 2.3 MG/DL (ref 2.5–4.5)
POTASSIUM BLD-SCNC: 4.2 MMOL/L (ref 3.4–5.3)
PROT SERPL-MCNC: 8 G/DL (ref 6.8–8.8)
SODIUM SERPL-SCNC: 140 MMOL/L (ref 133–144)

## 2023-03-24 PROCEDURE — 84146 ASSAY OF PROLACTIN: CPT | Performed by: INTERNAL MEDICINE

## 2023-03-24 PROCEDURE — 99000 SPECIMEN HANDLING OFFICE-LAB: CPT | Performed by: INTERNAL MEDICINE

## 2023-03-24 PROCEDURE — 82088 ASSAY OF ALDOSTERONE: CPT | Performed by: INTERNAL MEDICINE

## 2023-03-24 PROCEDURE — 84100 ASSAY OF PHOSPHORUS: CPT | Performed by: INTERNAL MEDICINE

## 2023-03-24 PROCEDURE — 80053 COMPREHEN METABOLIC PANEL: CPT | Performed by: INTERNAL MEDICINE

## 2023-03-24 PROCEDURE — 83835 ASSAY OF METANEPHRINES: CPT | Mod: 90 | Performed by: INTERNAL MEDICINE

## 2023-03-24 PROCEDURE — 82533 TOTAL CORTISOL: CPT | Performed by: INTERNAL MEDICINE

## 2023-03-24 PROCEDURE — 99215 OFFICE O/P EST HI 40 MIN: CPT | Performed by: INTERNAL MEDICINE

## 2023-03-24 PROCEDURE — 82024 ASSAY OF ACTH: CPT | Performed by: INTERNAL MEDICINE

## 2023-03-24 PROCEDURE — 36415 COLL VENOUS BLD VENIPUNCTURE: CPT | Performed by: INTERNAL MEDICINE

## 2023-03-24 PROCEDURE — 84244 ASSAY OF RENIN: CPT | Mod: 90 | Performed by: INTERNAL MEDICINE

## 2023-03-24 ASSESSMENT — PAIN SCALES - GENERAL: PAINLEVEL: MILD PAIN (2)

## 2023-03-24 NOTE — LETTER
3/24/2023         RE: Eloisa Rene  [address] [place] [address]        Dear Colleague,    Thank you for referring your patient, Eloisa Rene, to the [facility]. Please see a copy of my visit note below.                                                                               - Endocrinology Follow up -    Reason for visit/consult:  Elevated cortisol level    Primary care provider: Clinic, [facility]      Assessment and Plan  17 year old female with elevated cortisol level    There are some findings which suspect for Cushing's disease however still biochemically inconclusive. ACTH was 94 elevated in 11/2022 and never suppressed, so if exists, will be ACTH dependent.     MRI 7T done read as negative but may have some hypoenhanced lesion? Therefore we sent her to IPSS in 2/2023, IPSS showed no elevated of ACTH at all entire course. At this point, there is not enough evidence of ACTH dependent Cushing's disease. However she multiple symptoms including weakness, fatigue, dizziness, light sensitivity, anxiety, all of which significantly affecting her quality of life.     Meanwhile we did other tests to rule out PCOS, showed SHBG low, and slight high normal tesotsterone level    Today she also mentioned some possible lactation from her right nipple recently.    BP has been higher side today 140/90s.     - try metformin 500 mg BID    - rule out endocrine HTN  - metanephrine, renin, aldosterone    - will schedule DOTATATE scan    Repeat following in 6 month    - repeat UFC   - salivary cortisol test  - 1 mg Dex        Other non endocrine cause  Currently evaluated by [facility], she just had EMG, per patient and family they are considering autoimmune work up       RTC with me in 6 month       Total 45 minutes spent on the date of the encounter doing chart review, history and exam, documentation and further activities as noted above.        Daisy Lucero MD  Staff  Physician  Endocrinology and Metabolism  License: [id number]    Interval History as of 3/24/2023 : Patient has been not feeling well, she has multiple symptoms including weakness, fatigue, dizziness, light sensitivity, anxiety, all of which significantly affecting her quality of life. BP has been higher side. she also mentioned some possible lactation from her right nipple recently.  HPI: A 16 yo female here for the second opinion of hypercortisolemia. Referral from Dr. Childs.   Patient father accompanied the session.  Patient described since April 2022 she started to feel multiple weird symptoms.  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  She described she feel intermittently more, some vibration sensation inside of her body.  Also she mentioned some palpitations, especially when she is resting at night.  Symptom alleviated after multiple eating of salt and crackers.  She has polyuria started April 2022 she said approximately 4 times a night.  Also she mentioned she has vertigo quite often and she described she feels off balance.  Regarding her palpitation she also completed cardiology work-up in end of summer 2022 including 7 days monitoring and average heart rate was 87 and she was told possible sinus tachycardia without any remarkable arrhythmia.   She recalls since February 2021, she has repeated symptoms of urinary tract infection. Urine culture results 4 times and each time bacteria negative.   She also mentioned she experiences episode of right side of the eye and cheek drop but no numbness and the last few hours, she has been experience this episode approximately twice a week.   She also mentioned she had a significant weight gain since April 2022 she recalls her body weight is around 160s and today her body weight is 195 pounds.  She already visited multiple specialties primary doctor, cardiology,  "she was referred to endocrinologist Dr. Childs, where hypercholesterolemia work-up has been done intensively.   Random cortisol 10:00 in the morning was 29 with ACTH 178.  She underwent dexamethasone test 3 times.  First around 1 mg dexamethasone suppression test was 7.1 elevated, on October 17, 2022 she underwent 8 mg dexamethasone suppression test which was appropriately suppressed at 0.61.  She was repeated 1 mg dexamethasone suppression test on October 19, 2022 which shows borderline suppressed 2.24.  She underwent urinary free cortisol 24-hour test initially  October 10, 2022 was 40 with upper limit of 45, second test was done October 13, 2022 which has mildly elevated to 55.  She underwent salivary cortisol test to twice in the role first test was on November 2, 2022 and second test was November 30, 2022, she kept a 7 pound 12 reiterate her\" altogether and both of which normal range.   She also underwent brain MRI on October 28, 2022 which was read as normal no significant pituitary gland.   She also mentioned that her blood pressure has been mildly elevated recently and today 137/84 with pulse 82.  She also mentioned her glucose mildly elevated on October 6, 2022 fasting glucose was 102 .  Her prolactin level has been mildly elevated 30s-40.  Her menarche age 12 her menstrual cycle has been recently 28 to 35 days cycle she recently on birth control pills since July 2021 .  Denied hirsutism .  She also has depression and panic attack for 1.5 years but she described past several months get worse  .she came here for the second opinion today.   She also described her weight gain she noticed more abdominal fat accumulation.  She also mentioned difficult healing from minor injury.  She has some few acnes on her face.  She also mentioned she noticed some faint red stria around her stomach and axillary area.       Encounters  - from Last 3 Months    Past Medical/Surgical History:  No past medical history on " "file.  Past Surgical History:   Procedure Laterality Date     IR CAROTID CEREBRAL ANGIOGRAM BILATERAL  2/15/2023       Allergies:  Allergies   Allergen Reactions     Adderall Other (See Comments)     She did not feel well while taking      Apri Unknown     Fluoxetine Other (See Comments)     Suicidal thoughts      Gianvi Other (See Comments)     Suicidal thoughts        Current Medications   Current Outpatient Medications   Medication     ibuprofen (ADVIL/MOTRIN) 200 MG tablet     VITAMIN D PO     diazepam (VALIUM) 5 MG tablet     No current facility-administered medications for this visit.       Family History:  Family History   Problem Relation Age of Onset     Congenital Anomalies Brother         Down's Syndrome       Social History:  Social History     Tobacco Use     Smoking status: Never     Smokeless tobacco: Never   Substance Use Topics     Alcohol use: Not on file       ROS:  Full review of systems taken with the help of the intake sheet. Otherwise a complete 14 point review of systems was taken and is negative unless stated in the history above.      Physical Exam:   Vitals: BP (!) 144/85   Pulse (!) 124   Resp 20   Ht 1.772 m (5' 9.75\")   Wt 92.9 kg (204 lb 14.4 oz)   SpO2 100%   BMI 29.61 kg/m    BMI= Body mass index is 29.61 kg/m .   General: well appearing, no acute distress, pleasant and conversant,   Mental Status/neuro: alert and oriented  Face: symmetrical, pink facial color, no hirsutism  Eyes: anicteric,, no proptosis or lid lag  Resp: normally breathing  Thighs: thin purple stretch marks  Legs: no swelling or edema        Labs : I reviewed data from epic and extract and summarize the pertinent data here.   No results found for: NA   No results found for: POTASSIUM  No results found for: CHLORIDE  No results found for: RHIANNON  No results found for: CO2  No results found for: BUN  No results found for: CR  No results found for: GLC  No results found for: TSH  No results found for: T4  No " results found for: A1C    No results found for: IGF1  No results found for: LH  No results found for: FSH  No results found for: ESTROGEN  No results found for: PROLACTIN        MRI Brain: I personally reviewed the original images and agree with the below reports.   Reviewed.             Again, thank you for allowing me to participate in the care of your patient.        Sincerely,        Daisy Lucero MD

## 2023-03-24 NOTE — NURSING NOTE
"Eloisa Rene's goals for this visit include: See list with patient   She requests these members of her care team be copied on today's visit information: YES    PCP: Jessie Caro    Referring Provider:  No referring provider defined for this encounter.    BP (!) 144/85   Pulse (!) 124   Resp 20   Ht 1.772 m (5' 9.75\")   Wt 92.9 kg (204 lb 14.4 oz)   SpO2 100%   BMI 29.61 kg/m      Do you need any medication refills at today's visit? NONE    Sudheer Kim, EMT      "

## 2023-03-24 NOTE — PROGRESS NOTES
- Endocrinology Follow up -    Reason for visit/consult:  Elevated cortisol level    Primary care provider: [facility], [facility]      Assessment and Plan  17 year old female with elevated cortisol level    There are some findings which suspect for Cushing's disease however still biochemically inconclusive. ACTH was 94 elevated in 11/2022 and never suppressed, so if exists, will be ACTH dependent.     MRI 7T done read as negative but may have some hypoenhanced lesion? Therefore we sent her to IPSS in 2/2023, IPSS showed no elevated of ACTH at all entire course. At this point, there is not enough evidence of ACTH dependent Cushing's disease. However she multiple symptoms including weakness, fatigue, dizziness, light sensitivity, anxiety, all of which significantly affecting her quality of life.     Meanwhile we did other tests to rule out PCOS, showed SHBG low, and slight high normal tesotsterone level    Today she also mentioned some possible lactation from her right nipple recently.    BP has been higher side today 140/90s.     - try metformin 500 mg BID    - rule out endocrine HTN  - metanephrine, renin, aldosterone    - will schedule DOTATATE scan    Repeat following in 6 month    - repeat UFC   - salivary cortisol test  - 1 mg Dex        Other non endocrine cause  Currently evaluated by [facility], she just had EMG, per patient and family they are considering autoimmune work up       RTC with me in 6 month       Total 45 minutes spent on the date of the encounter doing chart review, history and exam, documentation and further activities as noted above.        Daisy Lucero MD  Staff Physician  Endocrinology and Metabolism  License: [id number]    Interval History as of 3/24/2023 : Patient has been not feeling well, she has multiple symptoms including weakness, fatigue, dizziness, light sensitivity, anxiety, all of which significantly  affecting her quality of life. BP has been higher side. she also mentioned some possible lactation from her right nipple recently.  HPI: A 18 yo female here for the second opinion of hypercortisolemia. Referral from Dr. Childs.   Patient father accompanied the session.  Patient described since April 2022 she started to feel multiple weird symptoms.  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  She described she feel intermittently more, some vibration sensation inside of her body.  Also she mentioned some palpitations, especially when she is resting at night.  Symptom alleviated after multiple eating of salt and crackers.  She has polyuria started April 2022 she said approximately 4 times a night.  Also she mentioned she has vertigo quite often and she described she feels off balance.  Regarding her palpitation she also completed cardiology work-up in end of summer 2022 including 7 days monitoring and average heart rate was 87 and she was told possible sinus tachycardia without any remarkable arrhythmia.   She recalls since February 2021, she has repeated symptoms of urinary tract infection. Urine culture results 4 times and each time bacteria negative.   She also mentioned she experiences episode of right side of the eye and cheek drop but no numbness and the last few hours, she has been experience this episode approximately twice a week.   She also mentioned she had a significant weight gain since April 2022 she recalls her body weight is around 160s and today her body weight is 195 pounds.  She already visited multiple specialties primary doctor, cardiology, she was referred to endocrinologist Dr. Childs, where hypercholesterolemia work-up has been done intensively.   Random cortisol 10:00 in the morning was 29 with ACTH 178.  She underwent dexamethasone test 3 times.  First around 1 mg dexamethasone  "suppression test was 7.1 elevated, on October 17, 2022 she underwent 8 mg dexamethasone suppression test which was appropriately suppressed at 0.61.  She was repeated 1 mg dexamethasone suppression test on October 19, 2022 which shows borderline suppressed 2.24.  She underwent urinary free cortisol 24-hour test initially  October 10, 2022 was 40 with upper limit of 45, second test was done October 13, 2022 which has mildly elevated to 55.  She underwent salivary cortisol test to twice in the role first test was on November 2, 2022 and second test was November 30, 2022, she kept a 7 pound 12 reiterate her\" altogether and both of which normal range.   She also underwent brain MRI on October 28, 2022 which was read as normal no significant pituitary gland.   She also mentioned that her blood pressure has been mildly elevated recently and today 137/84 with pulse 82.  She also mentioned her glucose mildly elevated on October 6, 2022 fasting glucose was 102 .  Her prolactin level has been mildly elevated 30s-40.  Her menarche age 12 her menstrual cycle has been recently 28 to 35 days cycle she recently on birth control pills since July 2021 .  Denied hirsutism .  She also has depression and panic attack for 1.5 years but she described past several months get worse  .she came here for the second opinion today.   She also described her weight gain she noticed more abdominal fat accumulation.  She also mentioned difficult healing from minor injury.  She has some few acnes on her face.  She also mentioned she noticed some faint red stria around her stomach and axillary area.       Encounters  - from Last 3 Months    Past Medical/Surgical History:  No past medical history on file.  Past Surgical History:   Procedure Laterality Date     IR CAROTID CEREBRAL ANGIOGRAM BILATERAL  2/15/2023       Allergies:  Allergies   Allergen Reactions     Adderall Other (See Comments)     She did not feel well while taking      Apri Unknown     " "Fluoxetine Other (See Comments)     Suicidal thoughts      Gianvi Other (See Comments)     Suicidal thoughts        Current Medications   Current Outpatient Medications   Medication     ibuprofen (ADVIL/MOTRIN) 200 MG tablet     VITAMIN D PO     diazepam (VALIUM) 5 MG tablet     No current facility-administered medications for this visit.       Family History:  Family History   Problem Relation Age of Onset     Congenital Anomalies Brother         Down's Syndrome       Social History:  Social History     Tobacco Use     Smoking status: Never     Smokeless tobacco: Never   Substance Use Topics     Alcohol use: Not on file       ROS:  Full review of systems taken with the help of the intake sheet. Otherwise a complete 14 point review of systems was taken and is negative unless stated in the history above.      Physical Exam:   Vitals: BP (!) 144/85   Pulse (!) 124   Resp 20   Ht 1.772 m (5' 9.75\")   Wt 92.9 kg (204 lb 14.4 oz)   SpO2 100%   BMI 29.61 kg/m    BMI= Body mass index is 29.61 kg/m .   General: well appearing, no acute distress, pleasant and conversant,   Mental Status/neuro: alert and oriented  Face: symmetrical, pink facial color, no hirsutism  Eyes: anicteric,, no proptosis or lid lag  Resp: normally breathing  Thighs: thin purple stretch marks  Legs: no swelling or edema        Labs : I reviewed data from epic and extract and summarize the pertinent data here.   No results found for: NA   No results found for: POTASSIUM  No results found for: CHLORIDE  No results found for: RHIANNON  No results found for: CO2  No results found for: BUN  No results found for: CR  No results found for: GLC  No results found for: TSH  No results found for: T4  No results found for: A1C    No results found for: IGF1  No results found for: LH  No results found for: FSH  No results found for: ESTROGEN  No results found for: PROLACTIN        MRI Brain: I personally reviewed the original images and agree with the below " reports.   Reviewed.

## 2023-03-25 LAB
CORTIS SERPL-MCNC: 9.8 UG/DL
PROLACTIN SERPL 3RD IS-MCNC: 17 NG/ML (ref 5–23)

## 2023-03-27 ENCOUNTER — TELEPHONE (OUTPATIENT)
Dept: ENDOCRINOLOGY | Facility: CLINIC | Age: 18
End: 2023-03-27
Payer: COMMERCIAL

## 2023-03-27 LAB
ACTH PLAS-MCNC: 30 PG/ML
ANNOTATION COMMENT IMP: NORMAL
METANEPHS SERPL-SCNC: 0.2 NMOL/L
NORMETANEPHRINE SERPL-SCNC: 0.49 NMOL/L
RENIN PLAS-CCNC: 1.6 NG/ML/HR

## 2023-03-27 NOTE — TELEPHONE ENCOUNTER
Left Voicemail (1st Attempt) for the patient to call back and schedule the following:      Return date: next opening   Specialty phone number: [phone]   Additional appointment(s) needed: pet scan     Ella goodson Procedure   Orthopedics, Podiatry, Sports Medicine, Ent ,Eye , Audiology, Adult Endocrine & Diabetes, Nutrition & Medication Therapy Management Specialties   [facility]

## 2023-03-28 LAB — ALDOST SERPL-MCNC: 4.1 NG/DL (ref 0–31)

## 2023-04-11 ENCOUNTER — TELEPHONE (OUTPATIENT)
Dept: ENDOCRINOLOGY | Facility: CLINIC | Age: 18
End: 2023-04-11
Payer: COMMERCIAL

## 2023-04-11 NOTE — TELEPHONE ENCOUNTER
[employer]: Cary dupree/ Yelena Case/Ref #: [id number]      For Patient's healthcare plan the Dotatate PET/CT w/ diagnostic CT CAP Peer to Peer can not have to be scheduled. Provider will need to call same number and transfer Dr Lucero/provider to provider hours are M-F 7a-7p.     Provider notified.   Deneen Chaparro RN on 2023 at 7:58 AM       Insurance: [facility] Vendor: [email or web address]   Phone #: [phone]   Due Date: 2023     Patient ID: [id number]   Case/Ref #: [id number]              RE    ----- Message from Daisy Lucero MD sent at 4/10/2023  7:02 PM CDT -----  Regarding: RE:  Dotatate PET/CT w/ diagnostic CT CAP denied- Peer/peer needed  Contact: [phone]  Thank you.      Olivia and Deneen,     Could you please help me to call insutance medical officer to get an appointment?   I am on call this week, to basically, any time/day works for me.     Thank you so much ! (Sorry I just sent duplicate message a few minutes ago)    Daisy  ----- Message -----  From: Janna Celestin  Sent: 4/10/2023   8:20 AM CDT  To: TIERA Timmons; #  Subject: RE:  Dotatate PET/CT w/ diagnostic CT CA#    Hello team,    I received a denial letter for this pt's PET. Looks like only the CT CAP. Was a peer/peer done? Should this be cancelled and rescheduled to CT CAP only?    Thank you,  Janna   ----- Message -----  From: Daisy Lucero MD  Sent: 2023   2:46 PM CDT  To: TIERA Timmons; Janna Celestin; #  Subject: RE:  Dotatate PET/CT w/ diagnostic CT CA#    Thank you Hannah Schwartz; please make an appoitment for tomorrow, (anytime fine)     Daisy  ----- Message -----  From: Janna Celestin  Sent: 2023  12:49 PM CDT  To: TIERA Timmons; #  Subject:  Dotatate PET/CT w/ diagnostic CT CAP de#    Insurance Denial Notification    Patient Name:  Eloisa Rene  :    2005  MRN:    [id number]    Dr. Lucero,    The authorization for procedure dotatate PET/CT w/  diagnostic CT chest, abdomen, pelvis  on date of service 4/18 has been denied by the patient's insurance for the following reason:    Denial Reason: patient does not have a pathologically proven diagnosis.     A [id number] review can be done by calling the insurance/third party authorization vendor with the following information:    Insurance: YUNG LOPEZ  Auth Vendor:  [employer]  Phone #: [phone]  Due Date: 4/7/2023    Patient ID: [id number]  Case/Ref #: [id number]           Please let us know how you wish to proceed as soon as possible. We will contact the patient after 1 business day.    Thank you,    Janna Stephen Prior Authorization

## 2023-04-12 ENCOUNTER — TELEPHONE (OUTPATIENT)
Dept: ENDOCRINOLOGY | Facility: CLINIC | Age: 18
End: 2023-04-12
Payer: COMMERCIAL

## 2023-04-12 ENCOUNTER — MYC MEDICAL ADVICE (OUTPATIENT)
Dept: ENDOCRINOLOGY | Facility: CLINIC | Age: 18
End: 2023-04-12
Payer: COMMERCIAL

## 2023-04-12 DIAGNOSIS — E24.9 HYPERCORTISOLISM (H): ICD-10-CM

## 2023-04-12 NOTE — TELEPHONE ENCOUNTER
RE: 4/18 Dotatate PET/CT w/ diagnostic CT CAP denied- Peer/peer needed  Received: Today  Daisy Lucero MD Humphrey, Bradley A, TIERA; Janna Celestin; Rupa Crockett RN; [facility] Triage-  Phone Number: [phone]     Yes, now we got approved!!     CT chest, CT abdo, PET (DOTATATE) all!     Reference approval number: [id number]   Valid 4/6-6/4     Thank you so much!!     Daisy

## 2023-04-12 NOTE — TELEPHONE ENCOUNTER
----- Message from Daisy Lucero MD sent at 4/12/2023 12:26 PM CDT -----  Regarding: RE: 4/18 Dotatate PET/CT w/ diagnostic CT CAP denied- Peer/peer needed  Contact: [phone]  Yes, now we got approved!!    CT chest, CT abdo, PET (DOTATATE) all!    Reference approval number: [id number]  Valid 4/6-6/4    Thank you so much!!    Daisy   ----- Message -----  From: Willy Matthews CNMT  Sent: 4/12/2023  11:11 AM CDT  To: Janna Celestin; Rupa Crockett RN; #  Subject: RE: 4/18 Dotatate PET/CT w/ diagnostic CT CA#    Hi,  I need to cancel by Friday afternoon 2pm, and I am out tomorrow and Friday, in [place].  IF you do not let me know by today, you will need to CALL MY CELL PHONE [phone] before Friday 2pm and let me know  I need to cancel the dose. If I do not hear from anyone, the dose will be delivered, and we will be responsible for it, about 4k. Thanks!  ----- Message -----  From: Janna Celestin  Sent: 4/12/2023   9:42 AM CDT  To: TIERA Timmons; #  Subject: RE: 4/18 Dotatate PET/CT w/ diagnostic CT CA#    Dejan Lucero,    I called [facility] and you don't have to set up an appointment. You call in and they will get you a provider to do a peer/peer with.      Let me know if you aren't able to call today because I think Benjamin Matthews has a time frame that he will need to cancel the dose of contrast.     Thank you,  Janna   ----- Message -----  From: Daisy Lucero MD  Sent: 4/10/2023   7:04 PM CDT  To: TIERA Timmons; Janna Celestin; #  Subject: RE: 4/18 Dotatate PET/CT w/ diagnostic CT CA#    Thank you.      Wesleye,     Could you please help [facility] an [facility]?   I am on call this week, to basically, any time/day works for me.     Thank you so much ! (Sorry I just sent duplicate message a few minutes ago)    Daisy  ----- Message -----  From: Janna Celestin  Sent: 4/10/2023   8:20 AM CDT  To: TIERA Timmons; #  Subject:  RE:  Dotatate PET/CT w/ diagnostic CT CA#    Hello team,    I received a denial letter for this pt's PET. Looks like only the CT CAP. Was a peer/peer done? Should this be cancelled and rescheduled to CT CAP only?    Thank you,  Janna   ----- Message -----  From: Daisy Lucero MD  Sent: 2023   2:46 PM CDT  To: TIERA Timmons; Janna Celestin; #  Subject: RE:  Dotatate PET/CT w/ diagnostic CT CA#    Thank you Benjamin and Janna Schwartz; please make an appoitment for tomorrow, (anytime fine)     Daisy  ----- Message -----  From: Janna Celestin  Sent: 2023  12:49 PM CDT  To: TIERA Timmons; #  Subject:  Dotatate PET/CT w/ diagnostic CT CAP de#    Insurance Denial Notification    Patient Name:  Eloisa Rene  :    2005  MRN:    [id number]    Dr. Lucero,    The authorization for procedure dotatate PET/CT w/ diagnostic CT chest, abdomen, pelvis  on date of service  has been denied by the patient's insurance for the following reason:    Denial Reason: patient does not have a pathologically proven diagnosis.     A ojnd-tx-cnvr review can be done by calling the insurance/third party authorization vendor with the following information:    Insurance: [facility]  Auth Vendor:  [employer]  Phone #: [phone]  Due Date: 2023    Patient ID: [id number]  Case/Ref #: [id number]           Please let us know how you wish to proceed as soon as possible. We will contact the patient after 1 business day.    Thank you,    Janna Stephen Prior Authorization

## 2023-04-14 RX ORDER — DIAZEPAM 5 MG
5 TABLET ORAL
Qty: 2 TABLET | Refills: 1 | Status: SHIPPED | OUTPATIENT
Start: 2023-04-14

## 2023-04-18 ENCOUNTER — HOSPITAL ENCOUNTER (OUTPATIENT)
Dept: PET IMAGING | Facility: CLINIC | Age: 18
Discharge: HOME | End: 2023-04-18
Attending: INTERNAL MEDICINE | Admitting: INTERNAL MEDICINE
Payer: COMMERCIAL

## 2023-04-18 DIAGNOSIS — D3A.8 NEUROENDOCRINE NEOPLASM OF LUNG (H): ICD-10-CM

## 2023-04-18 PROCEDURE — 74177 CT ABD & PELVIS W/CONTRAST: CPT

## 2023-04-18 PROCEDURE — A9592 HC RX IP 250 OP 636: HCPCS | Performed by: INTERNAL MEDICINE

## 2023-04-18 PROCEDURE — 250N000011 HC RX IP 250 OP 636: Performed by: INTERNAL MEDICINE

## 2023-04-18 PROCEDURE — 78816 PET IMAGE W/CT FULL BODY: CPT | Mod: PI

## 2023-04-18 RX ORDER — IOPAMIDOL 755 MG/ML
0-135 INJECTION, SOLUTION INTRAVASCULAR ONCE
Status: COMPLETED | OUTPATIENT
Start: 2023-04-18 | End: 2023-04-18

## 2023-04-18 RX ADMIN — COPPER CU 64 DOTATATE 4.63 MILLICURIE: 1 INJECTION, SOLUTION INTRAVENOUS at 12:51

## 2023-04-18 RX ADMIN — IOPAMIDOL 113 ML: 755 INJECTION, SOLUTION INTRAVENOUS at 12:57

## 2023-04-24 NOTE — RESULT ENCOUNTER NOTE
Dear Eloisa     Here is your results. Special nuclear images show no highlighting lesion meaning at this point, we did not detect any lesion to consider ectopic Cushing, which is good, since ectopic Cushing is very complex condition.     Please call nursing line, if you are [facility] patient at [phone], if you are [facility] patient at [phone],  if you have any questions.    Please take care  Daisy Lucero MD

## 2023-05-01 ENCOUNTER — TELEPHONE (OUTPATIENT)
Dept: ENDOCRINOLOGY | Facility: CLINIC | Age: 18
End: 2023-05-01
Payer: COMMERCIAL

## 2023-05-01 NOTE — TELEPHONE ENCOUNTER
[facility] Call Center    Phone Message    May a detailed message be left on voicemail: yes     Reason for Call: Other: Patient calling and wanting to ask if she can take Advil with her Metformin. Please call back this morning if possible. Thank you.        metFORMIN (GLUCOPHAGE) 500 MG tablet         Action Taken: Other: Endo    Travel Screening: Not Applicable

## 2023-05-03 NOTE — TELEPHONE ENCOUNTER
Patient calling back also wants to know if there are any vitamins she Can not take while on metformin?

## 2023-05-04 NOTE — TELEPHONE ENCOUNTER
Returned patient call. OK to take Advil and Vitamins with Metformin.    Milagro Chen RN, Diabetes Educator  Diabetes Education Department  [facility] and [place]  [phone]

## 2023-05-12 ENCOUNTER — TRANSFERRED RECORDS (OUTPATIENT)
Dept: HEALTH INFORMATION MANAGEMENT | Facility: CLINIC | Age: 18
End: 2023-05-12

## 2023-05-30 ENCOUNTER — TRANSFERRED RECORDS (OUTPATIENT)
Dept: HEALTH INFORMATION MANAGEMENT | Facility: CLINIC | Age: 18
End: 2023-05-30

## 2023-05-30 LAB
HBA1C MFR BLD: 5.1 %
HEP C HIM: NORMAL
HIV 1&2 EXT: NORMAL

## 2023-06-15 ENCOUNTER — DOCUMENTATION ONLY (OUTPATIENT)
Dept: ENDOCRINOLOGY | Facility: CLINIC | Age: 18
End: 2023-06-15
Payer: COMMERCIAL

## 2023-06-15 NOTE — PROGRESS NOTES
"Fax contained 97 pages of records.     Patient Mychart message received; that patient would like provider to review and coordinate care with her care team.     Labeled and placed in \"new patient\" records.     Katelyn Bernal [facility]  Adult Endocrinology  [facility]     "

## 2023-06-19 ENCOUNTER — DOCUMENTATION ONLY (OUTPATIENT)
Dept: ENDOCRINOLOGY | Facility: CLINIC | Age: 18
End: 2023-06-19
Payer: COMMERCIAL

## 2023-06-19 NOTE — PROGRESS NOTES
[facility] sent over records for patient. Upcoming appointment on 10/13/2023 w/Dr. Lucero.     Placed with additional records from outside [facility].       Katelyn Bernal, [facility]  Adult Endocrinology  [facility]

## 2023-08-14 ENCOUNTER — TELEPHONE (OUTPATIENT)
Dept: ENDOCRINOLOGY | Facility: CLINIC | Age: 18
End: 2023-08-14
Payer: COMMERCIAL

## 2023-08-14 NOTE — TELEPHONE ENCOUNTER
[facility] Call Center    Phone Message    May a detailed message be left on voicemail: yes     Reason for Call: Other: received appeal for 12/12/2022, Appeals Dept needs provider assessment, progress notes, medical finding before tomorrow at noon which is when the appeal will take place. Please call or fax to the number provided thank you    Action Taken: Other: endo    Travel Screening: Not Applicable

## 2023-08-15 ENCOUNTER — TELEPHONE (OUTPATIENT)
Dept: ENDOCRINOLOGY | Facility: CLINIC | Age: 18
End: 2023-08-15
Payer: COMMERCIAL

## 2023-08-15 ENCOUNTER — MYC MEDICAL ADVICE (OUTPATIENT)
Dept: ENDOCRINOLOGY | Facility: CLINIC | Age: 18
End: 2023-08-15
Payer: COMMERCIAL

## 2023-08-15 NOTE — TELEPHONE ENCOUNTER
[facility] Call [facility]    Phone Message    May a detailed message be left on voicemail: yes     Reason for Call: Other: Called with approval, after review, for procedure performed on 12/12/22.  Approval is good for period of 11/14/2022-1/12/23  Approval number [id number] for the \"MRI\" performed.     Action Taken: Other: endo    Travel Screening: Not Applicable                                                                   "

## 2023-08-15 NOTE — TELEPHONE ENCOUNTER
Letter from Dr. Lucero dated 8/15/23, 11/11/2022 consult visit,Cortisol result from 10/19/22, 10/17/22 and 10/14/22 and urine cortisol from 10/13/22 all faxed to [facility]  Attn Gabby at [phone] as requested with confirmation of completion via right fax.     Writer did not received a call back from Gabby in order to find out exactly what was needed so writer faxed what was thought to be appropriate.    Romana Rasmussen, [facility]  Adult Endocrinology  [facility]

## 2023-08-15 NOTE — TELEPHONE ENCOUNTER
Called and left message for Gabby, What services are these records needed for? Ask Gabby to call the clinic back.    Romana Rasmussen, [facility]  Adult Endocrinology  [facility], [place]

## 2023-08-17 DIAGNOSIS — E86.1 HYPOVOLEMIA: ICD-10-CM

## 2023-08-17 DIAGNOSIS — G90.A POSTURAL ORTHOSTATIC TACHYCARDIA SYNDROME: ICD-10-CM

## 2023-08-17 DIAGNOSIS — G62.9 SMALL FIBER NEUROPATHY: Primary | ICD-10-CM

## 2023-09-15 ENCOUNTER — LAB (OUTPATIENT)
Dept: LAB | Facility: CLINIC | Age: 18
End: 2023-09-15
Payer: COMMERCIAL

## 2023-09-15 DIAGNOSIS — G90.A POSTURAL ORTHOSTATIC TACHYCARDIA SYNDROME: ICD-10-CM

## 2023-09-15 DIAGNOSIS — G62.9 SMALL FIBER NEUROPATHY: ICD-10-CM

## 2023-09-15 DIAGNOSIS — E86.1 HYPOVOLEMIA: ICD-10-CM

## 2023-09-15 LAB
Lab: NORMAL
PERFORMING LABORATORY: NORMAL
SPECIMEN STATUS: NORMAL
TEST NAME: NORMAL

## 2023-09-15 PROCEDURE — 86235 NUCLEAR ANTIGEN ANTIBODY: CPT | Mod: 59

## 2023-09-15 PROCEDURE — 99000 SPECIMEN HANDLING OFFICE-LAB: CPT

## 2023-09-15 PROCEDURE — 86225 DNA ANTIBODY NATIVE: CPT

## 2023-09-15 PROCEDURE — 86334 IMMUNOFIX E-PHORESIS SERUM: CPT

## 2023-09-15 PROCEDURE — 82784 ASSAY IGA/IGD/IGG/IGM EACH: CPT

## 2023-09-15 PROCEDURE — 86235 NUCLEAR ANTIGEN ANTIBODY: CPT

## 2023-09-15 PROCEDURE — 84588 ASSAY OF VASOPRESSIN: CPT | Mod: 90

## 2023-09-15 PROCEDURE — 36415 COLL VENOUS BLD VENIPUNCTURE: CPT

## 2023-09-15 PROCEDURE — 83516 IMMUNOASSAY NONANTIBODY: CPT | Mod: 59

## 2023-09-15 PROCEDURE — 86038 ANTINUCLEAR ANTIBODIES: CPT

## 2023-09-15 PROCEDURE — 86255 FLUORESCENT ANTIBODY SCREEN: CPT

## 2023-09-15 PROCEDURE — 83516 IMMUNOASSAY NONANTIBODY: CPT | Mod: 90

## 2023-09-16 LAB
CENTROMERE B AB SER-ACNC: <0.7 U/ML
CENTROMERE B AB SER-ACNC: NEGATIVE
DSDNA AB SER-ACNC: 3.1 IU/ML
ENA SCL70 IGG SER IA-ACNC: <0.6 U/ML
ENA SCL70 IGG SER IA-ACNC: NEGATIVE
ENA SM IGG SER IA-ACNC: <0.7 U/ML
ENA SM IGG SER IA-ACNC: NEGATIVE
ENA SS-A AB SER IA-ACNC: <0.5 U/ML
ENA SS-A AB SER IA-ACNC: NEGATIVE
ENA SS-B IGG SER IA-ACNC: <0.6 U/ML
ENA SS-B IGG SER IA-ACNC: NEGATIVE
U1 SNRNP IGG SER IA-ACNC: 1.4 U/ML
U1 SNRNP IGG SER IA-ACNC: NEGATIVE

## 2023-09-17 LAB — RNAP III IGG SERPL IA-ACNC: 3 UNITS

## 2023-09-18 LAB
ANA SER QL IF: NEGATIVE
IGA SERPL-MCNC: 353 MG/DL (ref 84–499)
IGG SERPL-MCNC: 1144 MG/DL (ref 610–1616)
IGM SERPL-MCNC: 185 MG/DL (ref 35–242)
PROT PATTERN SERPL IFE-IMP: NORMAL

## 2023-09-20 LAB — MISCELLANEOUS TEST 1 (ARUP): NORMAL

## 2023-09-21 LAB — VASOPRESSIN SERPL-MCNC: 3.7 PG/ML

## 2023-10-12 ASSESSMENT — ENCOUNTER SYMPTOMS
ARTHRALGIAS: 1
MUSCLE CRAMPS: 0
ALTERED TEMPERATURE REGULATION: 1
BOWEL INCONTINENCE: 0
HOARSE VOICE: 0
TINGLING: 1
FATIGUE: 1
SKIN CHANGES: 0
TASTE DISTURBANCE: 0
RECTAL PAIN: 0
WEIGHT GAIN: 0
BLOATING: 1
STIFFNESS: 0
DIFFICULTY URINATING: 0
HOT FLASHES: 1
POOR WOUND HEALING: 1
EXERCISE INTOLERANCE: 1
WEAKNESS: 1
FEVER: 0
ABDOMINAL PAIN: 0
NECK MASS: 0
POLYPHAGIA: 1
VOMITING: 0
NAIL CHANGES: 0
SEIZURES: 0
SORE THROAT: 0
SPEECH CHANGE: 0
NIGHT SWEATS: 0
BACK PAIN: 1
SNORES LOUDLY: 0
HYPERTENSION: 0
INCREASED ENERGY: 1
SPUTUM PRODUCTION: 0
WHEEZING: 0
SMELL DISTURBANCE: 0
POSTURAL DYSPNEA: 1
DYSURIA: 0
NUMBNESS: 0
SHORTNESS OF BREATH: 1
CHILLS: 0
DIARRHEA: 0
CONSTIPATION: 0
HEMOPTYSIS: 0
ORTHOPNEA: 1
LIGHT-HEADEDNESS: 1
SINUS CONGESTION: 0
HEMATURIA: 0
COUGH: 0
LOSS OF CONSCIOUSNESS: 0
PALPITATIONS: 1
TREMORS: 1
TROUBLE SWALLOWING: 0
HYPOTENSION: 0
MYALGIAS: 1
DECREASED APPETITE: 0
JAUNDICE: 0
JOINT SWELLING: 0
HEARTBURN: 1
MUSCLE WEAKNESS: 1
MEMORY LOSS: 0
POLYDIPSIA: 0
BLOOD IN STOOL: 0
DECREASED LIBIDO: 1
SINUS PAIN: 0
SYNCOPE: 0
DIZZINESS: 1
NAUSEA: 1
HALLUCINATIONS: 0
DISTURBANCES IN COORDINATION: 0
NECK PAIN: 0
LEG PAIN: 0
COUGH DISTURBING SLEEP: 0
PARALYSIS: 0
HEADACHES: 1
SLEEP DISTURBANCES DUE TO BREATHING: 0
WEIGHT LOSS: 0
FLANK PAIN: 0
DYSPNEA ON EXERTION: 1

## 2023-10-13 ENCOUNTER — OFFICE VISIT (OUTPATIENT)
Dept: ENDOCRINOLOGY | Facility: CLINIC | Age: 18
End: 2023-10-13
Payer: COMMERCIAL

## 2023-10-13 VITALS
WEIGHT: 203 LBS | SYSTOLIC BLOOD PRESSURE: 133 MMHG | OXYGEN SATURATION: 100 % | BODY MASS INDEX: 29.34 KG/M2 | HEART RATE: 121 BPM | DIASTOLIC BLOOD PRESSURE: 77 MMHG

## 2023-10-13 DIAGNOSIS — E24.0 PITUITARY DEPENDENT CUSHING DISEASE (H): Primary | ICD-10-CM

## 2023-10-13 PROCEDURE — 82533 TOTAL CORTISOL: CPT | Performed by: INTERNAL MEDICINE

## 2023-10-13 PROCEDURE — 99214 OFFICE O/P EST MOD 30 MIN: CPT | Performed by: INTERNAL MEDICINE

## 2023-10-13 PROCEDURE — 84270 ASSAY OF SEX HORMONE GLOBUL: CPT | Performed by: INTERNAL MEDICINE

## 2023-10-13 PROCEDURE — 36415 COLL VENOUS BLD VENIPUNCTURE: CPT | Performed by: INTERNAL MEDICINE

## 2023-10-13 PROCEDURE — 84403 ASSAY OF TOTAL TESTOSTERONE: CPT | Performed by: INTERNAL MEDICINE

## 2023-10-13 PROCEDURE — 82024 ASSAY OF ACTH: CPT | Performed by: INTERNAL MEDICINE

## 2023-10-13 PROCEDURE — 82670 ASSAY OF TOTAL ESTRADIOL: CPT | Performed by: INTERNAL MEDICINE

## 2023-10-13 RX ORDER — KETOCONAZOLE 200 MG/1
100 TABLET ORAL DAILY
Qty: 45 TABLET | Refills: 3 | Status: SHIPPED | OUTPATIENT
Start: 2023-10-13

## 2023-10-13 RX ORDER — DEXAMETHASONE 1 MG
1 TABLET ORAL ONCE
Qty: 1 TABLET | Refills: 0 | Status: SHIPPED | OUTPATIENT
Start: 2023-10-13 | End: 2023-10-13

## 2023-10-13 NOTE — NURSING NOTE
Eloisa Rene's goals for this visit include:   Chief Complaint   Patient presents with    Follow Up    Endocrine Problem     Pituitary dependent Cushing     She requests these members of her care team be copied on today's visit information: Yes     PCP: Jessie Caro    Referring Provider:  No referring provider defined for this encounter.    /77 (BP Location: Left arm, Patient Position: Sitting, Cuff Size: Adult Regular)   Pulse (!) 121   Wt 92.1 kg (203 lb)   LMP 10/05/2023 (Exact Date)   SpO2 100%   BMI 29.34 kg/m      Do you need any medication refills at today's visit? No    Katelyn Bernal CMA  Adult Endocrinology   [facility]

## 2023-10-13 NOTE — PROGRESS NOTES
- Endocrinology Follow up -    Reason for visit/consult:  Elevated cortisol level    Primary care provider: [facility]      Assessment and Plan  17 year old female with elevated cortisol level.    Suspected Cushing's disease- There are some findings which suspect for Cushing's disease however still biochemically inconclusive. ACTH was 94 elevated in 11/2022 and never suppressed, so if exists, will be ACTH dependent. MRI 7T done read as negative but may have some hypoenhanced lesion? Therefore we sent her to [facility] in 2/2023, [facility] showed no elevated of ACTH at all entire course. At this point, there is not enough evidence of ACTH dependent Cushing's disease. However she multiple symptoms including weakness, fatigue, dizziness, light sensitivity, anxiety, all of which significantly affecting her quality of life. Meanwhile we did other tests to rule out PCOS, showed SHBG low, and slight high normal tesotsterone level. Overall, she has had some cortisol testing that has resulted normal or high normal that are not definite of Cushing's disease but does present with many of the symptoms and physical manifestations of the disease. Therefore, plan on doing follow-up testing listed below today and discussed with the patient starting a 100 mg dose of ketoconazole to trial for a few months if her tests continue to come back as high normal to see if this provides symptomatic relief. Discussed with patient side effects of adrenal crisis and low cortisol.         Plan:  - Repeat salivary cortisol, 24 hour urine cortisol, serum cortisol, testosterone, estradiol, ACTH    - repeat cortisone dexamethasone suppression test, 24 hour UFC, salivary cortisol  - prescribing 100 mg ketoconazole daily. If any one of them eleavated, then we can try ketoconazole 100 mg daily for a few month.     Discussed with patient to consider taking if cortisol values come back at high normal range due to suffering from symptoms    - after the  result, and ketoconazole, then we will communicate with Dr. Jai Pimentel, MS3  Medical Student     Attending tie-in note  I saw the patient with Margarito BOWMAN3 and directly examined patient and discussed. Agree above note and plan.       35 minutes spent on the date of the encounter doing chart review, history and exam, documentation and further activities as noted above.    Daisy Lucero MD  Staff Physician  Endocrinology and Metabolism  [facility]  License: [id number]  Pager: [phone]         Summary of Cortisol testing:  - Salivary Cortisol Testing  11/03/2022- cortisol salvia <50  11/15/2022 - coritsol saliva - <0.012  12/01/1011 - cortisol saliva - 0.030      - Urinary Cortisol Testing  10/10/2022 - Cortisol urine 24 hour - 40 (3.5-45 mcg)/24 hr  10/13/2022 - cortisol urine 24 hour - 55 (3.5 -45 mcg)/24 hour - high  11/15/2022 - cortisol free urine 16.50 micrograms/L       - 1mg/8mg Dexamethasone suppression testing  10/06/2022 - Cortisol (serum?) 29.36 (3.44 - 22.45 micrograms/dl)  10/14/2022 - cortisol serum 7.10 (3.44-22.45 micrograms/dL)  10/17/2022- cortisol serum 0.61 (3.44-22.45 micrograms/dL)  10/19/2022 - cortisol 2.24 (3.44 -22.45 micrograms/dL)  11/14/2022 - cortisol serum 19.7    - random cortisol ACTH  10/06/2022 - ACTH  178 (high)  10/19/2022 - ACTH - 18 (normal)      Interval History as of 10/13/2023: Patient endorses same symptoms of weakness, fatigue, dizziness, light sensitivity, and anxiety all of which are greatly affecting her quality of life. Patient had taken metformin for a few months after the last visit but stopped due to GI issues. Over the last year, she has been working with a neurologist Dr. Vergara at [facility] and was diagnosed with POTS and small fiber neuropathy which has been hypothesized to be from her suspected Cushing's.   Interval History as of 3/24/2023 : Patient has been not feeling well, she has multiple symptoms including weakness,  fatigue, dizziness, light sensitivity, anxiety, all of which significantly affecting her quality of life. BP has been higher side. she also mentioned some possible lactation from her right nipple recently.  HPI: A 18 yo female here for the second opinion of hypercortisolemia. Referral from Dr. Childs.   Patient father accompanied the session.  Patient described since April 2022 she started to feel multiple weird symptoms.  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  She described she feel intermittently more, some vibration sensation inside of her body.  Also she mentioned some palpitations, especially when she is resting at night.  Symptom alleviated after multiple eating of salt and crackers.  She has polyuria started April 2022 she said approximately 4 times a night.  Also she mentioned she has vertigo quite often and she described she feels off balance.  Regarding her palpitation she also completed cardiology work-up in end of summer 2022 including 7 days monitoring and average heart rate was 87 and she was told possible sinus tachycardia without any remarkable arrhythmia.   She recalls since February 2021, she has repeated symptoms of urinary tract infection. Urine culture results 4 times and each time bacteria negative.   She also mentioned she experiences episode of right side of the eye and cheek drop but no numbness and the last few hours, she has been experience this episode approximately twice a week.   She also mentioned she had a significant weight gain since April 2022 she recalls her body weight is around 160s and today her body weight is 195 pounds.  She already visited multiple specialties primary doctor, cardiology, she was referred to endocrinologist Dr. Childs, where hypercholesterolemia work-up has been done intensively.   Random cortisol 10:00 in the morning was 29 with ACTH 178.  She  "underwent dexamethasone test 3 times.  First around 1 mg dexamethasone suppression test was 7.1 elevated, on October 17, 2022 she underwent 8 mg dexamethasone suppression test which was appropriately suppressed at 0.61.  She was repeated 1 mg dexamethasone suppression test on October 19, 2022 which shows borderline suppressed 2.24.  She underwent urinary free cortisol 24-hour test initially  October 10, 2022 was 40 with upper limit of 45, second test was done October 13, 2022 which has mildly elevated to 55.  She underwent salivary cortisol test to twice in the role first test was on November 2, 2022 and second test was November 30, 2022, she kept a 7 pound 12 reiterate her\" altogether and both of which normal range.   She also underwent brain MRI on October 28, 2022 which was read as normal no significant pituitary gland.   She also mentioned that her blood pressure has been mildly elevated recently and today 137/84 with pulse 82.  She also mentioned her glucose mildly elevated on October 6, 2022 fasting glucose was 102 .  Her prolactin level has been mildly elevated 30s-40.  Her menarche age 12 her menstrual cycle has been recently 28 to 35 days cycle she recently on birth control pills since July 2021 .  Denied hirsutism .  She also has depression and panic attack for 1.5 years but she described past several months get worse  .she came here for the second opinion today.   She also described her weight gain she noticed more abdominal fat accumulation.  She also mentioned difficult healing from minor injury.  She has some few acnes on her face.  She also mentioned she noticed some faint red stria around her stomach and axillary area.       Encounters  - from Last 3 Months    Past Medical/Surgical History:  No past medical history on file.  Past Surgical History:   Procedure Laterality Date    IR CAROTID CEREBRAL ANGIOGRAM BILATERAL  2/15/2023       Allergies:  Allergies   Allergen Reactions    " Amphetamine-Dextroamphet Er Other (See Comments)     She did not feel well while taking     Desogestrel-Ethinyl Estradiol Unknown    Drospirenone-Ethinyl Estradiol Other (See Comments)     Suicidal thoughts     Fluoxetine Other (See Comments)     Suicidal thoughts        Current Medications   Current Outpatient Medications   Medication    dexAMETHasone (DECADRON) 1 MG tablet    ibuprofen (ADVIL/MOTRIN) 200 MG tablet    ketoconazole (NIZORAL) 200 MG tablet    VITAMIN D PO    diazepam (VALIUM) 5 MG tablet    metFORMIN (GLUCOPHAGE) 500 MG tablet     No current facility-administered medications for this visit.       Family History:  Family History   Problem Relation Age of Onset    Congenital Anomalies Brother         Down's Syndrome       Social History:  Social History     Tobacco Use    Smoking status: Never    Smokeless tobacco: Never   Substance Use Topics    Alcohol use: Not on file       ROS:  Full review of systems taken with the help of the intake sheet. Otherwise a complete 14 point review of systems was taken and is negative unless stated in the history above.      Physical Exam:   Vitals: /77 (BP Location: Left arm, Patient Position: Sitting, Cuff Size: Adult Regular)   Pulse (!) 121   Wt 92.1 kg (203 lb)   LMP 10/05/2023 (Exact Date)   SpO2 100%   BMI 29.34 kg/m    BMI= Body mass index is 29.34 kg/m .   General: well appearing, no acute distress, pleasant and conversant  Mental Status/neuro: alert and oriented  Face: symmetrical, pink facial color, no hirsutism  Eyes: anicteric,, no proptosis or lid lag  Resp: normally breathing  Thighs: thin purple stretch marks  Legs: no swelling or edema        Labs : I reviewed data from epic and extract and summarize the pertinent data here.     - overview of cortisol testing is under interval history    Lab on 09/15/2023   Component Date Value Ref Range Status    See Scanned Result 09/15/2023 Specimen received. Reordered and sent to performing laboratory.  Report to follow up on completion.   Final    Performing Laboratory 09/15/2023 [email or web address]   Final    Test Name 09/15/2023 Sensory Neuropathy antibody panel with reflex to titer and neuronal immublot   Final    Test Code 09/15/2023 [id number]   Final    Vasopressin 09/15/2023 3.7  0.0 - 6.9 pg/mL Final    INTERPRETIVE INFORMATION: Arginine Vasopressin Hormone    This test was developed and its performance characteristics   determined by [email or web address]. It has not been cleared or   approved by the US Food and Drug Administration. This test   was performed in a CLIA certified laboratory and is   intended for clinical purposes.  Performed By: [email or web address]  [address] [place] [id number]  : Cal Bosch MD, PhD  CLIA Number: [id number]    DNA (ds) Antibody 09/15/2023 3.1  <10.0 IU/mL Final    Negative    RNA Polymerase III Antibody, IgG 09/15/2023 3  0 - 19 Units Final    Comment: INTERPRETIVE INFORMATION: RNA Polymerase III Antibody, IgG      19 Units or less ......Negative    20 - 39 Units .........Weak Positive    40 - 80 Units .........Moderate Positive    81 Units or greater ...Strong Positive    The presence of RNA polymerase III IgG antibody, when   considered in conjunction with other laboratory and   clinical findings, is an aid in the diagnosis of systemic   sclerosis (SSc) with increased incidence of skin   involvement and renal crisis with the diffuse cutaneous   form of SSc. RNA polymerase III IgG antibody occur in about   11-23 percent of SSc patients, and typically in the absence   of anti-centromere and anti-Scl-70 antibodies.    A negative result indicates no detectable IgG antibodies to   the dominant antigen of RNA polymerase III and does not   rule out the possibility of SSc. False-positive results may   also occur due to non-specific binding of immune complexes.   Strong clinical correlation is recommended.    If clinical suspicion remains,                             consider additional testing   for other antibodies associated with SSc, including   centromere, Scl-70, U3-RNP, PM/Scl, or Th/To.  Performed By: [facility]  [address] [place] [address]  : Cal Bosch MD, PhD  CLIA Number: [id number]    Centromere Vandana IgG Instrument Value 09/15/2023 <0.7  <7.0 U/mL Final    Centromere Antibody IgG 09/15/2023 Negative  Negative Final    Scl-70 Vandana IgG Instrument Value 09/15/2023 <0.6  <7.0 U/mL Final    Scl-70 Antibody IgG 09/15/2023 Negative  Negative Final    RNP Vandana IgG Instrument Value 09/15/2023 1.4  <5.0 U/mL Final    RNP Antibody IgG 09/15/2023 Negative  Negative Final    Floyd DAISY Vandana IgG Instrument Value 09/15/2023 <0.7  <7.0 U/mL Final    Floyd DAISY Antibody IgG 09/15/2023 Negative  Negative Final    SSA Vandana IgG Instrument Value 09/15/2023 <0.5  <7.0 U/mL Final    SSA (Ro) Antibody IgG 09/15/2023 Negative  Negative Final    SSB Vandana IgG Instrument Value 09/15/2023 <0.6  <7.0 U/mL Final    SSB (La) Antibody IgG 09/15/2023 Negative  Negative Final    RAFAEL interpretation 09/15/2023 Negative  Negative Final      Negative:              <1:40  Borderline Positive:   1:40 - 1:80  Positive:              >1:80    Immunoglobulin M 09/15/2023 185  35 - 242 mg/dL Final    Immunoglobulin A 09/15/2023 353  84 - 499 mg/dL Final    Immunoglobulin G 09/15/2023 1,144  610 - 1,616 mg/dL Final    Immunofixation ELP 09/15/2023 No monoclonal protein seen on immunofixation. Pathologic significance requires clinical correlation. BERNARD Ruff M.D., Ph.D., Pathologist   Final    Miscellaneous Test 09/15/2023 SEE NOTE   Final    Comment: Test name                    Result Flag  Units  RefIntvl  ------------------------------------------------------------  Purkinje Cell/Neuronal Nuclear IgG Scrn                        None Detected             None Detected    SAM-1, SAM-2, PCCA-1 or PCCA-Tr(DNER) antibodies not   detected. No further  testing will be performed.  INTERPRETIVE INFORMATION: Purkinje Cell/Neuronal Nuclear   IgG Scrn    This test was developed and its performance characteristics   determined by [facility]. It has not been cleared or   approved by the US Food and Drug Administration. This test   was performed in a CLIA certified laboratory and is   intended for clinical purposes.  SGPG Antibody, IgM                                0.20          IV 0.00-0.99      INTERPRETIVE INFORMATION: SGPG Antibody, IgM    The majority of sulfate-3-glucuronyl paragloboside (SGPG)   IgM-positive  sera will show reactivity against MAG. Patients who are   SGPG IgM  positive and MAG IgM negative may have multi-focal                            motor   neuropathy  with conduction block.    This test was developed and its performance characteristics   determined by [facility]. It has not been cleared or   approved by the US Food and Drug Administration. This test   was performed in a CLIA certified laboratory and is   intended for clinical purposes.  MAG Antibody, IgM Mellissa                               <1000          TU 0-999          INTERPRETIVE INFORMATION: MAG Antibody, IgM MELLISSA    An elevated IgM antibody concentration greater than 999 TU   against myelin-associated glycoprotein (MAG) suggests   active demyelination in peripheral neuropathy. A normal   concentration (less than 999 TU) generally rules out an   anti-MAG antibody-associated peripheral neuropathy.    TU=Titer Units    This test was developed and its performance characteristics   determined by [facility]. It has not been cleared or   approved by the US Food and Drug Administration. This test   was performed in a CLIA certified laboratory and is                              intended for clinical purposes.  Performed By: [facility]  [address] [place] [address]  : Cal Bosch MD, PhD  CLIA Number: [id number]      Lab  Results   Component Value Date    WBC 6.1 02/15/2023    HGB 13.6 02/15/2023    HCT 42.6 02/15/2023     02/15/2023     03/24/2023     02/15/2023    POTASSIUM 4.2 03/24/2023    POTASSIUM 4.3 02/15/2023    CHLORIDE 108 03/24/2023    CHLORIDE 106 02/15/2023    CO2 31 03/24/2023    CO2 24 02/15/2023    BUN 13 03/24/2023    BUN 13.8 02/15/2023    CR 0.61 03/24/2023    CR 0.61 02/15/2023     (H) 03/24/2023     (H) 02/15/2023    AST 14 03/24/2023    ALT 24 03/24/2023    ALKPHOS 124 03/24/2023    BILITOTAL 0.4 03/24/2023    INR 1.02 02/15/2023          MRI Brain: I personally reviewed the original images and agree with the below reports.   Reviewed.           Answers submitted by the patient for this visit:  Symptoms you have experienced in the last 30 days (Submitted on 10/12/2023)  General Symptoms: Yes  Skin Symptoms: Yes  HENT Symptoms: Yes  EYE SYMPTOMS: No  HEART SYMPTOMS: Yes  LUNG SYMPTOMS: Yes  INTESTINAL SYMPTOMS: Yes  URINARY SYMPTOMS: Yes  GYNECOLOGIC SYMPTOMS: Yes  BREAST SYMPTOMS: No  SKELETAL SYMPTOMS: Yes  BLOOD SYMPTOMS: No  NERVOUS SYSTEM SYMPTOMS: Yes  MENTAL HEALTH SYMPTOMS: No  Please answer the questions below to tell us what conditions you are experiencing: (Submitted on 10/12/2023)  Ear pain: No  Ear discharge: No  Hearing loss: No  Tinnitus: Yes  Nosebleeds: No  Congestion: No  Sinus pain: No  Trouble swallowing: No   Voice hoarseness: No  Mouth sores: No  Sore throat: No  Tooth pain: No  Gum tenderness: No  Bleeding gums: No  Change in taste: No  Change in sense of smell: No  Dry mouth: No  Hearing aid used: No  Neck lump: No  Please answer the questions below to tell us what conditions you are experiencing: (Submitted on 10/12/2023)  Fever: No  Loss of appetite: No  Weight loss: No  Weight gain: No  Fatigue: Yes  Night sweats: No  Chills: No  Increased stress: No  Excessive hunger: Yes  Excessive thirst: No  Feeling hot or cold when others believe the temperature is  normal: Yes  Loss of height: No  Post-operative complications: No  Surgical site pain: No  Hallucinations: No  Change in or Loss of Energy: Yes  Hyperactivity: No  Confusion: No   (Submitted on 10/12/2023)  Changes in hair: Yes  Changes in moles/birth marks: No  Itching: Yes  Rashes: No  Changes in nails: No  Acne: Yes  Hair in places you don't want it: Yes  Change in facial hair: Yes  Warts: No  Non-healing sores: Yes  Scarring: Yes  Flaking of skin: No  Color changes of hands/feet in cold : Yes  Sun sensitivity: Yes  Skin thickening: No  Please answer the questions below to tell us what condition you are experiencing: (Submitted on 10/12/2023)  Chest pain or pressure: No  Fast or irregular heartbeat: Yes  Pain in legs with walking: No  Trouble breathing while lying down: Yes  Fingers or toes appear blue: Yes  High blood pressure: No  Low blood pressure: No  Fainting: No  Murmurs: No  Pacemaker: No  Varicose veins: No  Edema or swelling: No  Wake up at night with shortness of breath: No  Light-headedness: Yes  Exercise intolerance: Yes  Please answer the questions below to tell us what condition you are experiencing: (Submitted on 10/12/2023)  Cough: No  Sputum or phlegm: No  Coughing up blood: No  Difficulty breating or shortness of breath: Yes  Snoring: No  Wheezing: No  Difficulty breathing on exertion: Yes  Nighttime Cough: No  Difficulty breathing when lying flat: Yes  Please answer the questions below to tell us what conditions you are experiencing: (Submitted on 10/12/2023)  Heart burn or indigestion: Yes  Nausea: Yes  Vomiting: No  Abdominal pain: No  Bloating: Yes  Constipation: No  Diarrhea: No  Blood in stool: No  Black stools: No  Rectal or Anal pain: No  Fecal incontinence: No  Yellowing of skin or eyes: No  Vomit with blood: No  Change in stools: No  Please answer the questions below to tell us what condition you are experiencing: (Submitted on 10/12/2023)  Trouble holding urine or incontinence:  No  Pain or burning: No  Trouble starting or stopping: No  Increased frequency of urination: Yes  Blood in urine: No  Decreased frequency of urination: No  Frequent nighttime urination: Yes  Flank pain: No  Difficulty emptying bladder: No  Please answer the questions below to tell us what condition you are experiencing: (Submitted on 10/12/2023)  Bleeding or spotting between periods: No  Heavy or painful periods: Yes  Irregular periods: Yes  Vaginal discharge: No  Hot flashes: Yes  Vaginal dryness: No  Genital ulcers: No  Reduced libido: Yes  Painful intercourse: No  Difficulty with sexual arousal: No  Post-menopausal bleeding: No  Please answer the questions below to tell us what condition you are experiencing: (Submitted on 10/12/2023)  Back pain: Yes  Muscle aches: Yes  Neck pain: No  Swollen joints: No  Joint pain: Yes  Bone pain: No  Muscle cramps: No  Muscle weakness: Yes  Joint stiffness: No  Bone fracture: No  Please answer the questions below to tell us what condition you are experiencing: (Submitted on 10/12/2023)  Trouble with coordination: No  Dizziness or trouble with balance: Yes  Fainting or black-out spells: No  Memory loss: No  Headache: Yes  Seizures: No  Speech problems: No  Tingling: Yes  Tremor: Yes  Weakness: Yes  Difficulty walking: No  Paralysis: No  Numbness: No

## 2023-10-13 NOTE — PATIENT INSTRUCTIONS
[facility]-Department of Endocrinology  Diabetes Educators:   Milagro Chen, RN and Trupti Mason RN  Clinic Nurse: MANUEL Schwartz  CMA's: Katelyn Avendano and Sawyer   EMT: Sudheer  Scheduling/Clinic phone number : [phone]   Clinic Fax: [phone]  On-Call Endocrine at the [place] (after hours/weekends): [phone] option 4    Please call the number below to schedule your labs.  [facility] [phone]   [facility] [phone]   [place] [phone]   [facility]  [phone]   [facility] [phone]   [place] [phone]   [facility]) [phone]   [facility]   [place] [phone]   [place] [phone]   [place] [phone]   [place] [phone]     Please reach out to the following centers to schedule your imaging appointment:  Imaging (DEXA, CT, MRI, XRAY)    [facility] ([facility], [facility] [place]) [phone]   [facility] ([place], [place]) [phone]   [facility] ([facility]) [phone]   [facility] ([facility]) [phone]   .

## 2023-10-14 LAB
CORTIS SERPL-MCNC: 9.9 UG/DL
ESTRADIOL SERPL-MCNC: 30 PG/ML
SHBG SERPL-SCNC: 18 NMOL/L (ref 30–135)

## 2023-10-16 LAB — ACTH PLAS-MCNC: 44 PG/ML

## 2023-10-17 LAB
TESTOST FREE SERPL-MCNC: 0.58 NG/DL
TESTOST SERPL-MCNC: 24 NG/DL (ref 8–60)

## 2023-10-23 PROCEDURE — 99000 SPECIMEN HANDLING OFFICE-LAB: CPT | Performed by: INTERNAL MEDICINE

## 2023-10-23 PROCEDURE — 82533 TOTAL CORTISOL: CPT | Mod: 90 | Performed by: INTERNAL MEDICINE

## 2023-10-24 PROCEDURE — 82533 TOTAL CORTISOL: CPT | Mod: 90

## 2023-10-24 PROCEDURE — 99000 SPECIMEN HANDLING OFFICE-LAB: CPT

## 2023-10-25 ENCOUNTER — LAB (OUTPATIENT)
Dept: LAB | Facility: CLINIC | Age: 18
End: 2023-10-25
Payer: COMMERCIAL

## 2023-10-25 DIAGNOSIS — E24.0 PITUITARY DEPENDENT CUSHING DISEASE (H): ICD-10-CM

## 2023-10-25 PROCEDURE — 99000 SPECIMEN HANDLING OFFICE-LAB: CPT | Performed by: INTERNAL MEDICINE

## 2023-10-25 PROCEDURE — 82530 CORTISOL FREE: CPT | Mod: 90 | Performed by: INTERNAL MEDICINE

## 2023-10-29 LAB
ANNOTATION COMMENT IMP: ABNORMAL
CORTIS F 24H UR HPLC-MCNC: 22.4 UG/L
CORTIS F 24H UR-MRATE: 33.6 UG/D
CORTIS F/CREAT 24H UR: 20.36 UG/G CRT
CORTIS SAL-MCNC: 0.04 UG/DL
CORTIS SAL-MCNC: 0.04 UG/DL
CREAT 24H UR-MRATE: 1650 MG/D
CREAT UR-MCNC: 110 MG/DL

## 2024-02-03 ENCOUNTER — HEALTH MAINTENANCE LETTER (OUTPATIENT)
Age: 19
End: 2024-02-03

## 2024-03-13 ENCOUNTER — MEDICAL CORRESPONDENCE (OUTPATIENT)
Dept: HEALTH INFORMATION MANAGEMENT | Facility: CLINIC | Age: 19
End: 2024-03-13
Payer: COMMERCIAL

## 2024-03-21 ENCOUNTER — TRANSCRIBE ORDERS (OUTPATIENT)
Dept: OTHER | Age: 19
End: 2024-03-21

## 2024-03-21 DIAGNOSIS — G54.0 THORACIC OUTLET SYNDROME: Primary | ICD-10-CM

## 2024-04-24 ENCOUNTER — TELEPHONE (OUTPATIENT)
Dept: NEUROLOGY | Facility: CLINIC | Age: 19
End: 2024-04-24
Payer: COMMERCIAL

## 2024-04-24 NOTE — TELEPHONE ENCOUNTER
[facility] Call Center    Phone Message    May a detailed message be left on voicemail: yes     Reason for Call: Appointment Intake    Referring Provider Name: SWATHI SALAZAR affiliated with [facility]   Diagnosis and/or Symptoms: Thoracic outlet syndrome     Please review referral and assist with scheduling/ conflicting diagnosis with protocol, patient is referred to Dr. Iraheta.  2nd attempt per Pt.        Action Taken: Other: Neurology     Travel Screening: Not Applicable

## 2024-04-25 NOTE — TELEPHONE ENCOUNTER
RECORDS RECEIVED FROM: Care Everywhere   REASON FOR VISIT: Thoracic outlet syndrome [G54.0]   PROVIDER: Rich Lancaster DO   DATE OF APPT: 5/23/24 @ 11:00 am    NOTES (FOR ALL VISITS) STATUS DETAILS   OFFICE NOTE from referring provider Care Everywhere 3/13/24 (Orders Only), 11/9/23, 8/3/23 Mihai Vergara MD  @[facility]    See Additional Encounters   OFFICE NOTE from other specialist Care Everywhere 5/30/23, 5/12/23 (Transferred Recs) @[facility]     [facility] Care Everywhere [facility]  3/23/23 [facility]   MEDICATION LIST Internal    IMAGING  (FOR ALL VISITS)     PET Internal [facility]  4/18/23 PET Dotatate     IR Internal [facility]  2/15/24 IR Carotid Cerebral Angio Bilat     MRI (HEAD, NECK, SPINE) Internal [facility]  12/12/22 MR Pituitary    [facility]  10/28/22 MR Sella     CT (HEAD, NECK, SPINE) Internal [facility]  4/18/23 CT Chest Abdomen

## 2024-05-23 ENCOUNTER — VIRTUAL VISIT (OUTPATIENT)
Dept: NEUROLOGY | Facility: CLINIC | Age: 19
End: 2024-05-23
Payer: COMMERCIAL

## 2024-05-23 ENCOUNTER — PRE VISIT (OUTPATIENT)
Dept: NEUROLOGY | Facility: CLINIC | Age: 19
End: 2024-05-23

## 2024-05-23 DIAGNOSIS — R20.2 PARESTHESIAS: Primary | ICD-10-CM

## 2024-05-23 PROCEDURE — 99205 OFFICE O/P NEW HI 60 MIN: CPT | Mod: 95 | Performed by: PSYCHIATRY & NEUROLOGY

## 2024-05-23 NOTE — PROGRESS NOTES
Virtual Visit Details    Type of service:  Video Visit     Originating Location (pt. Location): Home    Distant Location (provider location):  On-site  Platform used for Video Visit: Shalonda

## 2024-05-23 NOTE — NURSING NOTE
Is the patient currently in the state of [place]? YES    Visit mode:VIDEO    If the visit is dropped, the patient can be reconnected by: VIDEO VISIT: Text to cell phone:   Telephone Information:   Mobile [phone]       Will anyone else be joining the visit? NO  (If patient encounters technical issues they should call [phone]    How would you like to obtain your AVS? MyChart    Are changes needed to the allergy or medication list? No    Are refills needed on medications prescribed by this physician? NO    Reason for visit: Consult    Sarina SCRUGGS

## 2024-05-23 NOTE — LETTER
5/23/2024       RE: Eloisa Rene  [address]  [place] [address]       Dear Colleague,    Thank you for referring your patient, Eloisa Rene, to the [facility] [place] at [facility]. Please see a copy of my visit note below.    [facility] Neurology Consultation    Eloisa Rene MRN# [id number]   Age: 19 year old YOB: 2005     Requesting physician: No ref. provider found  Clinic, [facility]     Reason for Consultation: paresthesias      History of Presenting Symptoms:   Eloisa Rene is a 19 year old female who presents today for evaluation of paresthesias.    The patient has been evaluated through [facility] neurology providers recently, 11/9/2023, for issues of POTS in the setting of suspected Cushing syndrome.  On review of those notes, pertinent information is as follows:  - 4/2022 onset of anxiety, temperature intolerance, dizziness, fatigue.  Saw cardiology and was stated to rule out cardiac structural issues but did find sinus tachycardia.  - Endocrinology evaluation showed cortisol and ACTH to both be elevated, and she had three separate trials of cosyntropin stimulation (failed one, normal on another, borderline suppression on another).   MRI brain  10/28/2022 showed normal pituitary gland, but repeat MRI on 7 venancio 12/12/2022 showed possible hypoechoic enhance lesion.  Cushings was inconclusive.  - Testing for autonomics was done.  Normal QSART, no evidence of peripheral neurpoathy, normal heart rate, normal blood pressure response todeep breathing and Valsalva. Normal cardiovagal and adrenergic function. Titl table showed baseline tachycardia, head tilt there were symptoms with rising blood pressure lasting one minute followed by excessive heart rate increment (maximal at 43 bpm).    - 3/24/2023 labs were normal (metanephrines, renin, aldosterone, cortisol, ACTH, BMP, Liver function)  - Referred to PT  program at [facility] for POTS.  Propranolol 10 mg PRN for anticipated stressors. Continue water and salt recommentations (salt tablets, 1 gallon water per day).  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  Trial of gabapentin recommended at 11/9/2023 visit.  PCP to consider mirabegron or other anticholinergic medications for bladder urgency.  - POTS PM&R evaluation 2/8/2024 reviewed  - 3/13/2024 orders for cervical myelopathy or TOS were made, but no notes are present to document reasoning.  MRI cervical spine ordered but not present in PACS or in review. Referral to other services for TOS is made, but again no specific doumentation about the order is made.    Today, the patient is wondering if she may have TOS.  When sitting in certain positions she has tingling in the arms or legs.  When moving her arms, she may have shaking in her hands.  Certain positions also make her shoulders shake.  When laying down she gets uncomfortable in her arms and shoulders b/l. She also expresses allodynia in her neck and shoulders.  She also feels vibrations in her arms, neck, hands.  She feels that any pressure on her elbows  her feel uncomfortable.  Many of these issues started two years ago. The symptoms have come on over time to become daily.  There was no specific fracture or trauma to the head or neck.      Regarding positions, she demonstrates flexing her elbow on a soft surface or hard surface that she gets shaking or tremor in her shoulder.  Laying down in the same side (laying on her right) will lead to both sides of her body (generally shoulders and arms).  She mentions floppiness of her arms as a symptom as well. Moving her arms above her head leads to high frequency and low amplitude tremors of the hands.  She mentions having internal tremors felt at most times, like an inner  sense of vibration (happens mostly on her neck or head).  This is not a defined sense of want to move or restlessness.  Most issues may not necessarily be tied to her neck motions (flexion/extension/rotation).  Sugars and carbs may trigger some of these tremors to some degree.  Afternoons seem to be when she is feeling her best.    There is no family history of specific neuromuscular conditions, or autoimmune disease.       Medications:   Diazepam 5 mg PRN for imaging  Metformin not taking  Dexamethasone PRN     Physical Exam:   General: Seated comfortably in no acute distress.  HEENT: Neck supple with normal range of motion.   Skin: No rashes  Neurologic:     Mental Status: Fully alert, attentive and oriented. Speech clear and fluent, no paraphasic errors.     Cranial Nerves: EOMI with normal smooth pursuit. Facial movements symmetric. Hearing not formally tested but intact to conversation.  No dysarthria.     Motor: Mild physiological tremor noted in fingers fluctuating after certain body motions.         Data: Pertinent prior to visit   Imaging:  Cerebral angiogram: 2/15/2023:  Key Findings:  Successful completion of bilateral inferior petrosal sinus sampling. The ACTH levels were reviewed. All values are low and are not  consistent with Cushing's disease.         Assessment and Plan:   Assessment:  Periodic paresthesias, shaking, weakness    The patient describes numerous positions where she may have physical manifestations of tremor in her extremities, sensory paresthesias, and generalized weakness in an extremity (not necessarily focal weakness). There doesn't seem to be cranial nerve involvement, or vision involvement, and the symptoms themselves seem transient but of which are occurring frequently.  Curiously, there does seem to be a connection with high carb type foods, but not necessarily a diurnal or sleep related pattern.  I do think ruling out potential reversible conditions like TOS or myelopathy of  the cervical spine seem quite reasonable as initial testing, and the patient was aware that I wasn't sure that even if one of these conditions was present it would provide an unifying diagnosis for all these issues.  Given her prior EMG through [facility] was normal, it seems difficult to really state the presence of something like HNPP, or a periodic paralysis (hyperkalemic or hypokalemic), but these etiologies may need revisiting given her acute onset at age 18-19 and ongoing progression.  Perhaps if testing returns negative, then serum panels or genetic testing could be considered for channelopathies.    Plan:  TOS us b/l   MRI cervical spine w/wout contrast    Follow up in Neurology clinic in 2 months (in-person if possible otherwise virtual is fine), or should new concerns arise.    Total time today (60 min) in this patient encounter was spent on pre-charting, counseling and/or coordination of care.  The patient is in agreement with this plan and has no further questions.        Again, thank you for allowing me to participate in the care of your patient.      Sincerely,    Rich Lancaster, DO

## 2024-05-23 NOTE — PROGRESS NOTES
[facility] Neurology Consultation    Eloisa Rene MRN# [id number]   Age: 19 year old YOB: 2005     Requesting physician: No ref. provider found  [facility], [facility]     Reason for Consultation: paresthesias      History of Presenting Symptoms:   Eloisa Rene is a 19 year old female who presents today for evaluation of paresthesias.    The patient has been evaluated through [facility] neurology providers recently, 11/9/2023, for issues of POTS in the setting of suspected Cushing syndrome.  On review of those notes, pertinent information is as follows:  - 4/2022 onset of anxiety, temperature intolerance, dizziness, fatigue.  Saw cardiology and was stated to rule out cardiac structural issues but did find sinus tachycardia.  - Endocrinology evaluation showed cortisol and ACTH to both be elevated, and she had three separate trials of cosyntropin stimulation (failed one, normal on another, borderline suppression on another).   MRI brain  10/28/2022 showed normal pituitary gland, but repeat MRI on 7 venancio 12/12/2022 showed possible hypoechoic enhance lesion.  Cushings was inconclusive.  - Testing for autonomics was done.  Normal QSART, no evidence of peripheral neurpoathy, normal heart rate, normal blood pressure response todeep breathing and Valsalva. Normal cardiovagal and adrenergic function. Titl table showed baseline tachycardia, head tilt there were symptoms with rising blood pressure lasting one minute followed by excessive heart rate increment (maximal at 43 bpm).    - 3/24/2023 labs were normal (metanephrines, renin, aldosterone, cortisol, ACTH, BMP, Liver function)  - Referred to PT program at [facility] for POTS.  Propranolol 10 mg PRN for anticipated stressors. Continue water and salt recommentations (salt tablets, 1 gallon water per day).  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  Trial of gabapentin recommended at 11/9/2023 visit.  PCP to consider mirabegron or other anticholinergic medications for bladder urgency.  - POTS PM&R evaluation 2/8/2024 reviewed  - 3/13/2024 orders for cervical myelopathy or TOS were made, but no notes are present to document reasoning.  MRI cervical spine ordered but not present in PACS or in review. Referral to other services for TOS is made, but again no specific doumentation about the order is made.    Today, the patient is wondering if she may have TOS.  When sitting in certain positions she has tingling in the arms or legs.  When moving her arms, she may have shaking in her hands.  Certain positions also make her shoulders shake.  When laying down she gets uncomfortable in her arms and shoulders b/l. She also expresses allodynia in her neck and shoulders.  She also feels vibrations in her arms, neck, hands.  She feels that any pressure on her elbows  her feel uncomfortable.  Many of these issues started two years ago. The symptoms have come on over time to become daily.  There was no specific fracture or trauma to the head or neck.      Regarding positions, she demonstrates flexing her elbow on a soft surface or hard surface that she gets shaking or tremor in her shoulder.  Laying down in the same side (laying on her right) will lead to both sides of her body (generally shoulders and arms).  She mentions floppiness of her arms as a symptom as well. Moving her arms above her head leads to high frequency and low amplitude tremors of the hands.  She mentions having internal tremors felt at most times, like an inner sense of vibration (happens mostly on her neck or head).  This is not a defined sense of want to move or restlessness.  Most issues may not necessarily be tied to her neck motions (flexion/extension/rotation).  Sugars and carbs may trigger some of these tremors to some degree.  Afternoons seem to be when she is  feeling her best.    There is no family history of specific neuromuscular conditions, or autoimmune disease.       Medications:   Diazepam 5 mg PRN for imaging  Metformin not taking  Dexamethasone PRN     Physical Exam:   General: Seated comfortably in no acute distress.  HEENT: Neck supple with normal range of motion.   Skin: No rashes  Neurologic:     Mental Status: Fully alert, attentive and oriented. Speech clear and fluent, no paraphasic errors.     Cranial Nerves: EOMI with normal smooth pursuit. Facial movements symmetric. Hearing not formally tested but intact to conversation.  No dysarthria.     Motor: Mild physiological tremor noted in fingers fluctuating after certain body motions.         Data: Pertinent prior to visit   Imaging:  Cerebral angiogram: 2/15/2023:  Key Findings:  Successful completion of bilateral inferior petrosal sinus sampling. The ACTH levels were reviewed. All values are low and are not  consistent with Cushing's disease.         Assessment and Plan:   Assessment:  Periodic paresthesias, shaking, weakness    The patient describes numerous positions where she may have physical manifestations of tremor in her extremities, sensory paresthesias, and generalized weakness in an extremity (not necessarily focal weakness). There doesn't seem to be cranial nerve involvement, or vision involvement, and the symptoms themselves seem transient but of which are occurring frequently.  Curiously, there does seem to be a connection with high carb type foods, but not necessarily a diurnal or sleep related pattern.  I do think ruling out potential reversible conditions like TOS or myelopathy of the cervical spine seem quite reasonable as initial testing, and the patient was aware that I wasn't sure that even if one of these conditions was present it would provide an unifying diagnosis for all these issues.  Given her prior EMG through [facility] was normal, it seems difficult to really state the presence of  something like HNPP, or a periodic paralysis (hyperkalemic or hypokalemic), but these etiologies may need revisiting given her acute onset at age 18-19 and ongoing progression.  Perhaps if testing returns negative, then serum panels or genetic testing could be considered for channelopathies.    Plan:  TOS us b/l   MRI cervical spine w/wout contrast    Follow up in Neurology clinic in 2 months (in-person if possible otherwise virtual is fine), or should new concerns arise.    BRIGIDA Lancaster D.O.   of Neurology    Total time today (60 min) in this patient encounter was spent on pre-charting, counseling and/or coordination of care.  The patient is in agreement with this plan and has no further questions.

## 2024-07-12 ENCOUNTER — OFFICE VISIT (OUTPATIENT)
Dept: ENDOCRINOLOGY | Facility: CLINIC | Age: 19
End: 2024-07-12
Payer: COMMERCIAL

## 2024-07-12 VITALS
SYSTOLIC BLOOD PRESSURE: 115 MMHG | BODY MASS INDEX: 27.2 KG/M2 | OXYGEN SATURATION: 100 % | HEIGHT: 70 IN | DIASTOLIC BLOOD PRESSURE: 80 MMHG | WEIGHT: 190 LBS | HEART RATE: 102 BPM

## 2024-07-12 DIAGNOSIS — E24.0 PITUITARY DEPENDENT CUSHING DISEASE (H): Primary | ICD-10-CM

## 2024-07-12 LAB
ALBUMIN SERPL BCG-MCNC: 4.6 G/DL (ref 3.5–5.2)
ALP SERPL-CCNC: 112 U/L (ref 40–150)
ALT SERPL W P-5'-P-CCNC: 10 U/L (ref 0–50)
ANION GAP SERPL CALCULATED.3IONS-SCNC: 10 MMOL/L (ref 7–15)
AST SERPL W P-5'-P-CCNC: 18 U/L (ref 0–45)
BILIRUB SERPL-MCNC: 0.4 MG/DL
BUN SERPL-MCNC: 12.9 MG/DL (ref 6–20)
CALCIUM SERPL-MCNC: 9.6 MG/DL (ref 8.6–10)
CHLORIDE SERPL-SCNC: 103 MMOL/L (ref 98–107)
CORTIS SERPL-MCNC: 11.8 UG/DL
CREAT SERPL-MCNC: 0.65 MG/DL (ref 0.51–0.95)
DEPRECATED HCO3 PLAS-SCNC: 26 MMOL/L (ref 22–29)
EGFRCR SERPLBLD CKD-EPI 2021: >90 ML/MIN/1.73M2
GLUCOSE SERPL-MCNC: 95 MG/DL (ref 70–99)
POTASSIUM SERPL-SCNC: 3.7 MMOL/L (ref 3.4–5.3)
PROLACTIN SERPL 3RD IS-MCNC: 20 NG/ML (ref 5–23)
PROT SERPL-MCNC: 8 G/DL (ref 6.4–8.3)
SODIUM SERPL-SCNC: 139 MMOL/L (ref 135–145)
T4 FREE SERPL-MCNC: 1.34 NG/DL (ref 0.9–1.7)
TSH SERPL DL<=0.005 MIU/L-ACNC: 1.82 UIU/ML (ref 0.3–4.2)

## 2024-07-12 PROCEDURE — 99214 OFFICE O/P EST MOD 30 MIN: CPT | Performed by: INTERNAL MEDICINE

## 2024-07-12 PROCEDURE — 36415 COLL VENOUS BLD VENIPUNCTURE: CPT | Performed by: INTERNAL MEDICINE

## 2024-07-12 PROCEDURE — 82533 TOTAL CORTISOL: CPT | Performed by: INTERNAL MEDICINE

## 2024-07-12 PROCEDURE — G2211 COMPLEX E/M VISIT ADD ON: HCPCS | Performed by: INTERNAL MEDICINE

## 2024-07-12 PROCEDURE — 84439 ASSAY OF FREE THYROXINE: CPT | Performed by: INTERNAL MEDICINE

## 2024-07-12 PROCEDURE — 84146 ASSAY OF PROLACTIN: CPT | Performed by: INTERNAL MEDICINE

## 2024-07-12 PROCEDURE — 80053 COMPREHEN METABOLIC PANEL: CPT | Performed by: INTERNAL MEDICINE

## 2024-07-12 PROCEDURE — 82024 ASSAY OF ACTH: CPT | Performed by: INTERNAL MEDICINE

## 2024-07-12 PROCEDURE — 84443 ASSAY THYROID STIM HORMONE: CPT | Performed by: INTERNAL MEDICINE

## 2024-07-12 NOTE — PATIENT INSTRUCTIONS
Welcome to the [facility]ocr[facility]ology [facility] Diabetes Clinics     Our Endocrinology Clinics are here to provide you with a team-based, collaborative approach in the diagnosis and treatment of patients with diabetes and endocrine disorders. The team is made up of Physicians, Physician Assistants, Certified Diabetes Educators, Registered Nurses, Medical Assistants, Emergency Medical Technicians, and many others, all of whom have the unified goal of providing our patients with high quality care.     Please see below for some helpful tips to best navigate and use the [facility] Endocrinology clinic:     [place] Respect: At [facility], we are committed to a respectful and safe space for all patients, visitors, and staff.  We believe that mutual respect between patients and their care team is the foundation of quality care.  It is our expectation that you will be treated with respect by your care team.  In turn, we ask that all communication with the care team (written and verbal) be respectful and free from profanity, threatening, or abusive language.  Disrespectful communication undermines our therapeutic relationship with you and may result in us being unable to continue to provide your care.    Refills: A provider must see you at least annually to prescribe and refill medications. This is to ensure your safety as well as meet insurance and compliance regulations.    Scheduling: Many of our Providers offer both in-person or video visits. Please call to schedule any needed follow ups as soon as possible because our provider schedules fill up very quickly. Our care team has the right to require an in-person visit when they believe that it is medically necessary. Please remember that for any virtual visits, you must be in the [facility] at the time of the visit, otherwise we are unable to see you and you will need to be rescheduled.    Missed Appointments: If you need to cancel or miss your  scheduled appointment, please call the clinic at [phone] to reschedule.  Please note if you repeatedly miss appointments or repeatedly miss appointments without calling to inform us ahead of time (no-show), the clinic may elect to not allow you to reschedule without speaking to a manager, may require a Partnership In Care Agreement prior to rescheduling, or could result in you no longer being able to receive care from the clinic. Providing the clinic with timely notification if you have to miss an appointment, allows us to better serve the needs of all of our patients.    Primary Care Provider: Our Endocrinologists are Specialists in their field. We expect you to have a Primary Care Provider established to handle any needs outside of your diabetes and endocrine care.  We would be happy to assist you find a Primary Care Provider, if you do not have one.    [employer]: [employer] is a wonderful resource that allows you access to your Care Team via online or the [email or web address]. Please ask a member of the team if you would like help creating an account. Please note that it may take up to 2 business days for a response. [employer] messages are not reviewed on weekends or after business hours.  Emergent or urgent care needs should never be communicated via [employer].  If you experience a medical emergency call 911 or go to the nearest emergency room.    Labs: It is recommended that you stay within the [facility] for labs but you are welcome to obtain ordered labs (with some exceptions) from any location of your choice as long as they are able to complete and process the needed labs. If you need us to fax orders to your preferred lab, please provide us the name and fax number of the lab you would like to go to so we can fax the orders. If your labs are drawn outside of the [facility], please have them fax the results to [phone] ([place]) or [phone] ([place]) or via [facility]. It is your  responsibility to ensure that outside lab results are sent to us.    We look forward to working with you. Please do not hesitate to reach out with any questions.    Thank you,    The Endocrine Team    [facility] Address:   [place] Address:     [address] [place] [address]    Phone: [phone]  Fax: [phone] [address] [place] [address]    Phone: [phone]  Fax: [phone]     [facility] Cost Estimate Phone Number: [phone]    General Lab and Imaging Scheduling Phone Number: [phone]

## 2024-07-12 NOTE — NURSING NOTE
"Eloisa Rene's goals for this visit include:   Chief Complaint   Patient presents with    Follow Up    Endocrine Problem       She requests these members of her care team be copied on today's visit information: Yes     PCP: Liane Smith MD    Referring Provider:  Daisy Lucero MD  [address] [place] [id number]    /80 (BP Location: Left arm, Patient Position: Sitting, Cuff Size: Adult Large)   Pulse 102   Ht 1.772 m (5' 9.75\")   Wt 86.2 kg (190 lb)   LMP 06/21/2024 (Exact Date)   SpO2 100%   BMI 27.46 kg/m      Do you need any medication refills at today's visit? No    Katelyn Bernal CMA  Adult Endocrinology   [facility]      "

## 2024-07-12 NOTE — LETTER
7/12/2024      Eloisa Rene  [address] [place] [address]      Dear Colleague,    Thank you for referring your patient, Eloisa Rene, to the [facility]. Please see a copy of my visit note below.    - Endocrinology Follow up -    Reason for visit/consult:  Elevated cortisol level    Primary care provider: Clinic, [facility]      Assessment and Plan  19 year old female with elevated cortisol level.    Suspected ACTH dependent Cushing's syndrome, but IPSS negative, MRI no obvious lesion    There are some findings which suspect for Cushing's disease however still biochemically inconclusive. ACTH was 94 elevated in 11/2022 and never suppressed, so if exists, will be ACTH dependent. MRI 7T done read as negative but may have some hypoenhanced lesion? Therefore we sent her to IPSS in 2/2023, IPSS showed no elevated of ACTH at all entire course. At this point, there is not enough evidence of ACTH dependent Cushing's disease. However she multiple symptoms including weakness, fatigue, dizziness, light sensitivity, anxiety, all of which significantly affecting her quality of life. Meanwhile we did other tests to rule out PCOS, showed SHBG low, and slight high normal tesotsterone level.     Challenging case, she still has multiple symptoms which may be suspect hypercortisolemia, discussed with the patient, we will continue to screen hypercortisolemia and work up periodically, also opened discussion again for cortisol blocking agents trial could be reasonable if we have more suspicious findings including lab tendency    - pituitary am lab    - 24 hour UFC twice    - midnight salivary cortisol twice    - if abnormal of any (UFC, salivary cortisol) then consider to repeat MRI, maybe this time 3T (previous 7T no additional information..)    RTC with me in 6 month       35 minutes spent on the date of the encounter doing chart review, history and exam, documentation and further  activities as noted above.    The longitudinal plan of care for the diagnosis(es)/condition(s) as documented were addressed during this visit. Due to the added complexity in care, I will continue to support Eloisa in the subsequent management and with ongoing continuity of care.     Daisy Lucero MD  Staff Physician  Endocrinology and Metabolism  [facility]  License: [id number]  Pager: [phone]         Summary of Cortisol testing:  - Salivary Cortisol Testing  11/03/2022- cortisol salvia <50  11/15/2022 - coritsol saliva - <0.012  12/01/1011 - cortisol saliva - 0.030      - Urinary Cortisol Testing  10/10/2022 - Cortisol urine 24 hour - 40 (3.5-45 mcg)/24 hr  10/13/2022 - cortisol urine 24 hour - 55 (3.5 -45 mcg)/24 hour - high  11/15/2022 - cortisol free urine 16.50 micrograms/L       - 1mg/8mg Dexamethasone suppression testing  10/06/2022 - Cortisol (serum?) 29.36 (3.44 - 22.45 micrograms/dl)  10/14/2022 - cortisol serum 7.10 (3.44-22.45 micrograms/dL)  10/17/2022- cortisol serum 0.61 (3.44-22.45 micrograms/dL)  10/19/2022 - cortisol 2.24 (3.44 -22.45 micrograms/dL)  11/14/2022 - cortisol serum 19.7    - random cortisol ACTH  10/06/2022 - ACTH  178 (high)  10/19/2022 - ACTH - 18 (normal)    Interval History as of 7/18/2024 : Patient has still same, weight gain despite intense life style modification, sleep patter off, awake at night.  Interval History as of 10/13/2023: Patient endorses same symptoms of weakness, fatigue, dizziness, light sensitivity, and anxiety all of which are greatly affecting her quality of life. Patient had taken metformin for a few months after the last visit but stopped due to GI issues. Over the last year, she has been working with a neurologist Dr. Vergara at [facility] and was diagnosed with POTS and small fiber neuropathy which has been hypothesized to be from her suspected Cushing's.   Interval History as of 3/24/2023 : Patient has been not feeling well, she has  multiple symptoms including weakness, fatigue, dizziness, light sensitivity, anxiety, all of which significantly affecting her quality of life. BP has been higher side. she also mentioned some possible lactation from her right nipple recently.  HPI: A 16 yo female here for the second opinion of hypercortisolemia. Referral from Dr. Childs.   Patient father accompanied the session.  Patient described since April 2022 she started to feel multiple weird symptoms.  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  She described she feel intermittently more, some vibration sensation inside of her body.  Also she mentioned some palpitations, especially when she is resting at night.  Symptom alleviated after multiple eating of salt and crackers.  She has polyuria started April 2022 she said approximately 4 times a night.  Also she mentioned she has vertigo quite often and she described she feels off balance.  Regarding her palpitation she also completed cardiology work-up in end of summer 2022 including 7 days monitoring and average heart rate was 87 and she was told possible sinus tachycardia without any remarkable arrhythmia.   She recalls since February 2021, she has repeated symptoms of urinary tract infection. Urine culture results 4 times and each time bacteria negative.   She also mentioned she experiences episode of right side of the eye and cheek drop but no numbness and the last few hours, she has been experience this episode approximately twice a week.   She also mentioned she had a significant weight gain since April 2022 she recalls her body weight is around 160s and today her body weight is 195 pounds.  She already visited multiple specialties primary doctor, cardiology, she was referred to endocrinologist Dr. Childs, where hypercholesterolemia work-up has been done intensively.   Random cortisol 10:00 in the  "morning was 29 with ACTH 178.  She underwent dexamethasone test 3 times.  First around 1 mg dexamethasone suppression test was 7.1 elevated, on October 17, 2022 she underwent 8 mg dexamethasone suppression test which was appropriately suppressed at 0.61.  She was repeated 1 mg dexamethasone suppression test on October 19, 2022 which shows borderline suppressed 2.24.  She underwent urinary free cortisol 24-hour test initially  October 10, 2022 was 40 with upper limit of 45, second test was done October 13, 2022 which has mildly elevated to 55.  She underwent salivary cortisol test to twice in the role first test was on November 2, 2022 and second test was November 30, 2022, she kept a 7 pound 12 reiterate her\" altogether and both of which normal range.   She also underwent brain MRI on October 28, 2022 which was read as normal no significant pituitary gland.   She also mentioned that her blood pressure has been mildly elevated recently and today 137/84 with pulse 82.  She also mentioned her glucose mildly elevated on October 6, 2022 fasting glucose was 102 .  Her prolactin level has been mildly elevated 30s-40.  Her menarche age 12 her menstrual cycle has been recently 28 to 35 days cycle she recently on birth control pills since July 2021 .  Denied hirsutism .  She also has depression and panic attack for 1.5 years but she described past several months get worse  .she came here for the second opinion today.   She also described her weight gain she noticed more abdominal fat accumulation.  She also mentioned difficult healing from minor injury.  She has some few acnes on her face.  She also mentioned she noticed some faint red stria around her stomach and axillary area.       Encounters  - from Last 3 Months    Past Medical/Surgical History:  No past medical history on file.  Past Surgical History:   Procedure Laterality Date     IR CAROTID CEREBRAL ANGIOGRAM BILATERAL  2/15/2023       Allergies:  Allergies " "  Allergen Reactions     Amphetamine-Dextroamphet Er Other (See Comments)     She did not feel well while taking      Desogestrel-Ethinyl Estradiol Unknown     Drospirenone-Ethinyl Estradiol Other (See Comments)     Suicidal thoughts      Fluoxetine Other (See Comments)     Suicidal thoughts        Current Medications   Current Outpatient Medications   Medication Sig Dispense Refill     ibuprofen (ADVIL/MOTRIN) 200 MG tablet Take 600 mg by mouth every 6 hours as needed for pain       VITAMIN D PO Take 1 tablet by mouth daily as needed       dexAMETHasone (DECADRON) 1 MG tablet Take 1 tablet (1 mg) by mouth once for 1 dose 1 tablet 0     diazepam (VALIUM) 5 MG tablet Take 1 tablet (5 mg) by mouth once as needed for anxiety Take 1 tablet before MRI (Patient not taking: Reported on 10/13/2023) 2 tablet 1     ketoconazole (NIZORAL) 200 MG tablet Take 0.5 tablets (100 mg) by mouth daily 45 tablet 3     metFORMIN (GLUCOPHAGE) 500 MG tablet Take 1 tablet (500 mg) by mouth 2 times daily (with meals) (Patient not taking: Reported on 10/13/2023) 180 tablet 3     No current facility-administered medications for this visit.       Family History:  Family History   Problem Relation Age of Onset     Congenital Anomalies Brother         Down's Syndrome       Social History:  Social History     Tobacco Use     Smoking status: Never     Smokeless tobacco: Never   Substance Use Topics     Alcohol use: Not on file       ROS:  Full review of systems taken with the help of the intake sheet. Otherwise a complete 14 point review of systems was taken and is negative unless stated in the history above.      Physical Exam:   Vitals: /80 (BP Location: Left arm, Patient Position: Sitting, Cuff Size: Adult Large)   Pulse 102   Ht 1.772 m (5' 9.75\")   Wt 86.2 kg (190 lb)   LMP 06/21/2024 (Exact Date)   SpO2 100%   BMI 27.46 kg/m    BMI= Body mass index is 27.46 kg/m .   General: well appearing, no acute distress, pleasant and " conversant  Mental Status/neuro: alert and oriented  Face: symmetrical, pink facial color, no hirsutism  Eyes: anicteric,, no proptosis or lid lag  Resp: normally breathing  Thighs: thin purple stretch marks  Legs: no swelling or edema        Labs : I reviewed data from epic and extract and summarize the pertinent data here.     - overview of cortisol testing is under interval history    Lab on 09/15/2023   Component Date Value Ref Range Status     See Scanned Result 09/15/2023 Specimen received. Reordered and sent to performing laboratory. Report to follow up on completion.   Final     Performing Laboratory 09/15/2023 [facility]   Final     Test Name 09/15/2023 Sensory Neuropathy antibody panel with reflex to titer and neuronal immublot   Final     Test Code 09/15/2023 [id number]   Final     Vasopressin 09/15/2023 3.7  0.0 - 6.9 pg/mL Final    INTERPRETIVE INFORMATION: Arginine Vasopressin Hormone    This test was developed and its performance characteristics   determined by [facility]. It has not been cleared or   approved by the US Food and Drug Administration. This test   was performed in a CLIA certified laboratory and is   intended for clinical purposes.  Performed By: [facility]  [address] [place] [id number]  : Cal Bosch MD, PhD  CLIA Number: [id number]     DNA (ds) Antibody 09/15/2023 3.1  <10.0 IU/mL Final    Negative     RNA Polymerase III Antibody, IgG 09/15/2023 3  0 - 19 Units Final    Comment: INTERPRETIVE INFORMATION: RNA Polymerase III Antibody, IgG      19 Units or less ......Negative    20 - 39 Units .........Weak Positive    40 - 80 Units .........Moderate Positive    81 Units or greater ...Strong Positive    The presence of RNA polymerase III IgG antibody, when   considered in conjunction with other laboratory and   clinical findings, is an aid in the diagnosis of systemic   sclerosis (SSc) with increased incidence of skin   involvement  and renal crisis with the diffuse cutaneous   form of SSc. RNA polymerase III IgG antibody occur in about   11-23 percent of SSc patients, and typically in the absence   of anti-centromere and anti-Scl-70 antibodies.    A negative result indicates no detectable IgG antibodies to   the dominant antigen of RNA polymerase III and does not   rule out the possibility of SSc. False-positive results may   also occur due to non-specific binding of immune complexes.   Strong clinical correlation is recommended.    If clinical suspicion remains,                            consider additional testing   for other antibodies associated with SSc, including   centromere, Scl-70, U3-RNP, PM/Scl, or Th/To.  Performed By: [facility] [place]  [address] [place] [id number]  : Cal Bosch MD, PhD  CLIA Number: [id number]     Centromere Vandana IgG Instrument Value 09/15/2023 <0.7  <7.0 U/mL Final     Centromere Antibody IgG 09/15/2023 Negative  Negative Final     Scl-70 Vandana IgG Instrument Value 09/15/2023 <0.6  <7.0 U/mL Final     Scl-70 Antibody IgG 09/15/2023 Negative  Negative Final     RNP Vandana IgG Instrument Value 09/15/2023 1.4  <5.0 U/mL Final     RNP Antibody IgG 09/15/2023 Negative  Negative Final     Floyd DAISY Vandana IgG Instrument Value 09/15/2023 <0.7  <7.0 U/mL Final     Floyd DAISY Antibody IgG 09/15/2023 Negative  Negative Final     SSA Vandana IgG Instrument Value 09/15/2023 <0.5  <7.0 U/mL Final     SSA (Ro) Antibody IgG 09/15/2023 Negative  Negative Final     SSB Vandana IgG Instrument Value 09/15/2023 <0.6  <7.0 U/mL Final     SSB (La) Antibody IgG 09/15/2023 Negative  Negative Final     RAFAEL interpretation 09/15/2023 Negative  Negative Final      Negative:              <1:40  Borderline Positive:   1:40 - 1:80  Positive:              >1:80     Immunoglobulin M 09/15/2023 185  35 - 242 mg/dL Final     Immunoglobulin A 09/15/2023 353  84 - 499 mg/dL Final     Immunoglobulin G 09/15/2023 1,144  610 -  1,616 mg/dL Final     Immunofixation ELP 09/15/2023 No monoclonal protein seen on immunofixation. Pathologic significance requires clinical correlation. BERNARD Ruff M.D., Ph.D., Pathologist   Final     Miscellaneous Test 09/15/2023 SEE NOTE   Final    Comment: Test name                    Result Flag  Units  RefIntvl  ------------------------------------------------------------  Purkinje Cell/Neuronal Nuclear IgG Scrn                        None Detected             None Detected    SAM-1, SAM-2, PCCA-1 or PCCA-Tr(DNER) antibodies not   detected. No further testing will be performed.  INTERPRETIVE INFORMATION: Purkinje Cell/Neuronal Nuclear   IgG Scrn    This test was developed and its performance characteristics   determined by [facility]. It has not been cleared or   approved by the US Food and Drug Administration. This test   was performed in a CLIA certified laboratory and is   intended for clinical purposes.  SGPG Antibody, IgM                                0.20          IV 0.00-0.99      INTERPRETIVE INFORMATION: SGPG Antibody, IgM    The majority of sulfate-3-glucuronyl paragloboside (SGPG)   IgM-positive  sera will show reactivity against MAG. Patients who are   SGPG IgM  positive and MAG IgM negative may have multi-focal                            motor   neuropathy  with conduction block.    This test was developed and its performance characteristics   determined by [facility]. It has not been cleared or   approved by the US Food and Drug Administration. This test   was performed in a CLIA certified laboratory and is   intended for clinical purposes.  MAG Antibody, IgM Mellissa                               <1000          TU 0-999          INTERPRETIVE INFORMATION: MAG Antibody, IgM MELLISSA    An elevated IgM antibody concentration greater than 999 TU   against myelin-associated glycoprotein (MAG) suggests   active demyelination in peripheral neuropathy. A normal   concentration (less than  999 TU) generally rules out an   anti-MAG antibody-associated peripheral neuropathy.    TU=Titer Units    This test was developed and its performance characteristics   determined by [facility]. It has not been cleared or   approved by the US Food and Drug Administration. This test   was performed in a CLIA certified laboratory and is                              intended for clinical purposes.  Performed By: [facility]  [address] [place] [id number]  : Cal Bosch MD, PhD  CLIA Number: [id number]      Lab Results   Component Value Date    WBC 6.1 02/15/2023    HGB 13.6 02/15/2023    HCT 42.6 02/15/2023     02/15/2023     07/12/2024     03/24/2023     02/15/2023    POTASSIUM 3.7 07/12/2024    POTASSIUM 4.2 03/24/2023    POTASSIUM 4.3 02/15/2023    CHLORIDE 103 07/12/2024    CHLORIDE 108 03/24/2023    CHLORIDE 106 02/15/2023    CO2 26 07/12/2024    CO2 31 03/24/2023    CO2 24 02/15/2023    BUN 12.9 07/12/2024    BUN 13 03/24/2023    BUN 13.8 02/15/2023    CR 0.65 07/12/2024    CR 0.61 03/24/2023    CR 0.61 02/15/2023    GLC 95 07/12/2024     (H) 03/24/2023     (H) 02/15/2023    AST 18 07/12/2024    AST 14 03/24/2023    ALT 10 07/12/2024    ALT 24 03/24/2023    ALKPHOS 112 07/12/2024    ALKPHOS 124 03/24/2023    BILITOTAL 0.4 07/12/2024    BILITOTAL 0.4 03/24/2023    INR 1.02 02/15/2023          MRI Brain: I personally reviewed the original images and agree with the below reports.   Reviewed.               Again, thank you for allowing me to participate in the care of your patient.        Sincerely,        Daisy Lucero MD

## 2024-07-15 LAB — ACTH PLAS-MCNC: 41 PG/ML

## 2024-07-19 NOTE — PROGRESS NOTES
- Endocrinology Follow up -    Reason for visit/consult:  Elevated cortisol level    Primary care provider: [facility], [facility]      Assessment and Plan  19 year old female with elevated cortisol level.    Suspected ACTH dependent Cushing's syndrome, but IPSS negative, MRI no obvious lesion    There are some findings which suspect for Cushing's disease however still biochemically inconclusive. ACTH was 94 elevated in 11/2022 and never suppressed, so if exists, will be ACTH dependent. MRI 7T done read as negative but may have some hypoenhanced lesion? Therefore we sent her to IPSS in 2/2023, IPSS showed no elevated of ACTH at all entire course. At this point, there is not enough evidence of ACTH dependent Cushing's disease. However she multiple symptoms including weakness, fatigue, dizziness, light sensitivity, anxiety, all of which significantly affecting her quality of life. Meanwhile we did other tests to rule out PCOS, showed SHBG low, and slight high normal tesotsterone level.     Challenging case, she still has multiple symptoms which may be suspect hypercortisolemia, discussed with the patient, we will continue to screen hypercortisolemia and work up periodically, also opened discussion again for cortisol blocking agents trial could be reasonable if we have more suspicious findings including lab tendency    - pituitary am lab    - 24 hour UFC twice    - midnight salivary cortisol twice    - if abnormal of any (UFC, salivary cortisol) then consider to repeat MRI, maybe this time 3T (previous 7T no additional information..)    RTC with me in 6 month       35 minutes spent on the date of the encounter doing chart review, history and exam, documentation and further activities as noted above.    The longitudinal plan of care for the diagnosis(es)/condition(s) as documented were addressed during this visit. Due to the added complexity in care, I will continue to support Eloisa in the subsequent management and  with ongoing continuity of care.     Daisy Lucero MD  Staff Physician  Endocrinology and Metabolism  [facility]  License: [id number]  Pager: [phone]         Summary of Cortisol testing:  - Salivary Cortisol Testing  11/03/2022- cortisol salvia <50  11/15/2022 - coritsol saliva - <0.012  12/01/1011 - cortisol saliva - 0.030      - Urinary Cortisol Testing  10/10/2022 - Cortisol urine 24 hour - 40 (3.5-45 mcg)/24 hr  10/13/2022 - cortisol urine 24 hour - 55 (3.5 -45 mcg)/24 hour - high  11/15/2022 - cortisol free urine 16.50 micrograms/L       - 1mg/8mg Dexamethasone suppression testing  10/06/2022 - Cortisol (serum?) 29.36 (3.44 - 22.45 micrograms/dl)  10/14/2022 - cortisol serum 7.10 (3.44-22.45 micrograms/dL)  10/17/2022- cortisol serum 0.61 (3.44-22.45 micrograms/dL)  10/19/2022 - cortisol 2.24 (3.44 -22.45 micrograms/dL)  11/14/2022 - cortisol serum 19.7    - random cortisol ACTH  10/06/2022 - ACTH  178 (high)  10/19/2022 - ACTH - 18 (normal)    Interval History as of 7/18/2024 : Patient has still same, weight gain despite intense life style modification, sleep patter off, awake at night.  Interval History as of 10/13/2023: Patient endorses same symptoms of weakness, fatigue, dizziness, light sensitivity, and anxiety all of which are greatly affecting her quality of life. Patient had taken metformin for a few months after the last visit but stopped due to GI issues. Over the last year, she has been working with a neurologist Dr. Vergara at [facility] and was diagnosed with POTS and small fiber neuropathy which has been hypothesized to be from her suspected Cushing's.   Interval History as of 3/24/2023 : Patient has been not feeling well, she has multiple symptoms including weakness, fatigue, dizziness, light sensitivity, anxiety, all of which significantly affecting her quality of life. BP has been higher side. she also mentioned some possible lactation from her right nipple recently.  HPI: A  18 yo female here for the second opinion of hypercortisolemia. Referral from Dr. Childs.   Patient father accompanied the session.  Patient described since April 2022 she started to feel multiple weird symptoms.  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  She described she feel intermittently more, some vibration sensation inside of her body.  Also she mentioned some palpitations, especially when she is resting at night.  Symptom alleviated after multiple eating of salt and crackers.  She has polyuria started April 2022 she said approximately 4 times a night.  Also she mentioned she has vertigo quite often and she described she feels off balance.  Regarding her palpitation she also completed cardiology work-up in end of summer 2022 including 7 days monitoring and average heart rate was 87 and she was told possible sinus tachycardia without any remarkable arrhythmia.   She recalls since February 2021, she has repeated symptoms of urinary tract infection. Urine culture results 4 times and each time bacteria negative.   She also mentioned she experiences episode of right side of the eye and cheek drop but no numbness and the last few hours, she has been experience this episode approximately twice a week.   She also mentioned she had a significant weight gain since April 2022 she recalls her body weight is around 160s and today her body weight is 195 pounds.  She already visited multiple specialties primary doctor, cardiology, she was referred to endocrinologist Dr. Childs, where hypercholesterolemia work-up has been done intensively.   Random cortisol 10:00 in the morning was 29 with ACTH 178.  She underwent dexamethasone test 3 times.  First around 1 mg dexamethasone suppression test was 7.1 elevated, on October 17, 2022 she underwent 8 mg dexamethasone suppression test which was appropriately suppressed at  "0.61.  She was repeated 1 mg dexamethasone suppression test on October 19, 2022 which shows borderline suppressed 2.24.  She underwent urinary free cortisol 24-hour test initially  October 10, 2022 was 40 with upper limit of 45, second test was done October 13, 2022 which has mildly elevated to 55.  She underwent salivary cortisol test to twice in the role first test was on November 2, 2022 and second test was November 30, 2022, she kept a 7 pound 12 reiterate her\" altogether and both of which normal range.   She also underwent brain MRI on October 28, 2022 which was read as normal no significant pituitary gland.   She also mentioned that her blood pressure has been mildly elevated recently and today 137/84 with pulse 82.  She also mentioned her glucose mildly elevated on October 6, 2022 fasting glucose was 102 .  Her prolactin level has been mildly elevated 30s-40.  Her menarche age 12 her menstrual cycle has been recently 28 to 35 days cycle she recently on birth control pills since July 2021 .  Denied hirsutism .  She also has depression and panic attack for 1.5 years but she described past several months get worse  .she came here for the second opinion today.   She also described her weight gain she noticed more abdominal fat accumulation.  She also mentioned difficult healing from minor injury.  She has some few acnes on her face.  She also mentioned she noticed some faint red stria around her stomach and axillary area.       Encounters  - from Last 3 Months    Past Medical/Surgical History:  No past medical history on file.  Past Surgical History:   Procedure Laterality Date    IR CAROTID CEREBRAL ANGIOGRAM BILATERAL  2/15/2023       Allergies:  Allergies   Allergen Reactions    Amphetamine-Dextroamphet Er Other (See Comments)     She did not feel well while taking     Desogestrel-Ethinyl Estradiol Unknown    Drospirenone-Ethinyl Estradiol Other (See Comments)     Suicidal thoughts     Fluoxetine Other (See " "Comments)     Suicidal thoughts        Current Medications   Current Outpatient Medications   Medication Sig Dispense Refill    ibuprofen (ADVIL/MOTRIN) 200 MG tablet Take 600 mg by mouth every 6 hours as needed for pain      VITAMIN D PO Take 1 tablet by mouth daily as needed      dexAMETHasone (DECADRON) 1 MG tablet Take 1 tablet (1 mg) by mouth once for 1 dose 1 tablet 0    diazepam (VALIUM) 5 MG tablet Take 1 tablet (5 mg) by mouth once as needed for anxiety Take 1 tablet before MRI (Patient not taking: Reported on 10/13/2023) 2 tablet 1    ketoconazole (NIZORAL) 200 MG tablet Take 0.5 tablets (100 mg) by mouth daily 45 tablet 3    metFORMIN (GLUCOPHAGE) 500 MG tablet Take 1 tablet (500 mg) by mouth 2 times daily (with meals) (Patient not taking: Reported on 10/13/2023) 180 tablet 3     No current facility-administered medications for this visit.       Family History:  Family History   Problem Relation Age of Onset    Congenital Anomalies Brother         Down's Syndrome       Social History:  Social History     Tobacco Use    Smoking status: Never    Smokeless tobacco: Never   Substance Use Topics    Alcohol use: Not on file       ROS:  Full review of systems taken with the help of the intake sheet. Otherwise a complete 14 point review of systems was taken and is negative unless stated in the history above.      Physical Exam:   Vitals: /80 (BP Location: Left arm, Patient Position: Sitting, Cuff Size: Adult Large)   Pulse 102   Ht 1.772 m (5' 9.75\")   Wt 86.2 kg (190 lb)   LMP 06/21/2024 (Exact Date)   SpO2 100%   BMI 27.46 kg/m    BMI= Body mass index is 27.46 kg/m .   General: well appearing, no acute distress, pleasant and conversant  Mental Status/neuro: alert and oriented  Face: symmetrical, pink facial color, no hirsutism  Eyes: anicteric,, no proptosis or lid lag  Resp: normally breathing  Thighs: thin purple stretch marks  Legs: no swelling or edema        Labs : I reviewed data from epic and " extract and summarize the pertinent data here.     - overview of cortisol testing is under interval history    Lab on 09/15/2023   Component Date Value Ref Range Status    See Scanned Result 09/15/2023 Specimen received. Reordered and sent to performing laboratory. Report to follow up on completion.   Final    Performing Laboratory 09/15/2023 [facility]   Final    Test Name 09/15/2023 Sensory Neuropathy antibody panel with reflex to titer and neuronal immublot   Final    Test Code 09/15/2023 [id number]   Final    Vasopressin 09/15/2023 3.7  0.0 - 6.9 pg/mL Final    INTERPRETIVE INFORMATION: Arginine Vasopressin Hormone    This test was developed and its performance characteristics   determined by [facility]. It has not been cleared or   approved by the US Food and Drug Administration. This test   was performed in a CLIA certified laboratory and is   intended for clinical purposes.  Performed By: [facility]  [address] [place] [id number]  : Cal Bosch MD, PhD  CLIA Number: [id number]    DNA (ds) Antibody 09/15/2023 3.1  <10.0 IU/mL Final    Negative    RNA Polymerase III Antibody, IgG 09/15/2023 3  0 - 19 Units Final    Comment: INTERPRETIVE INFORMATION: RNA Polymerase III Antibody, IgG      19 Units or less ......Negative    20 - 39 Units .........Weak Positive    40 - 80 Units .........Moderate Positive    81 Units or greater ...Strong Positive    The presence of RNA polymerase III IgG antibody, when   considered in conjunction with other laboratory and   clinical findings, is an aid in the diagnosis of systemic   sclerosis (SSc) with increased incidence of skin   involvement and renal crisis with the diffuse cutaneous   form of SSc. RNA polymerase III IgG antibody occur in about   11-23 percent of SSc patients, and typically in the absence   of anti-centromere and anti-Scl-70 antibodies.    A negative result indicates no detectable IgG antibodies to   the  dominant antigen of RNA polymerase III and does not   rule out the possibility of SSc. False-positive results may   also occur due to non-specific binding of immune complexes.   Strong clinical correlation is recommended.    If clinical suspicion remains,                            consider additional testing   for other antibodies associated with SSc, including   centromere, Scl-70, U3-RNP, PM/Scl, or Th/To.  Performed By: [facility]  [address] [place] [id number]  : Cal Bosch MD, PhD  CLIA Number: [id number]    Centromere Vandana IgG Instrument Value 09/15/2023 <0.7  <7.0 U/mL Final    Centromere Antibody IgG 09/15/2023 Negative  Negative Final    Scl-70 Vandana IgG Instrument Value 09/15/2023 <0.6  <7.0 U/mL Final    Scl-70 Antibody IgG 09/15/2023 Negative  Negative Final    RNP Vandana IgG Instrument Value 09/15/2023 1.4  <5.0 U/mL Final    RNP Antibody IgG 09/15/2023 Negative  Negative Final    Floyd DAISY Vandana IgG Instrument Value 09/15/2023 <0.7  <7.0 U/mL Final    Floyd DAISY Antibody IgG 09/15/2023 Negative  Negative Final    SSA Vandana IgG Instrument Value 09/15/2023 <0.5  <7.0 U/mL Final    SSA (Ro) Antibody IgG 09/15/2023 Negative  Negative Final    SSB Vandana IgG Instrument Value 09/15/2023 <0.6  <7.0 U/mL Final    SSB (La) Antibody IgG 09/15/2023 Negative  Negative Final    RAFAEL interpretation 09/15/2023 Negative  Negative Final      Negative:              <1:40  Borderline Positive:   1:40 - 1:80  Positive:              >1:80    Immunoglobulin M 09/15/2023 185  35 - 242 mg/dL Final    Immunoglobulin A 09/15/2023 353  84 - 499 mg/dL Final    Immunoglobulin G 09/15/2023 1,144  610 - 1,616 mg/dL Final    Immunofixation ELP 09/15/2023 No monoclonal protein seen on immunofixation. Pathologic significance requires clinical correlation. BERNARD Ruff M.D., Ph.D., Pathologist   Final    Miscellaneous Test 09/15/2023 SEE NOTE   Final    Comment: Test name                    Result Flag   Units  RefIntvl  ------------------------------------------------------------  Purkinje Cell/Neuronal Nuclear IgG Scrn                        None Detected             None Detected    SAM-1, SAM-2, PCCA-1 or PCCA-Tr(DNER) antibodies not   detected. No further testing will be performed.  INTERPRETIVE INFORMATION: Purkinje Cell/Neuronal Nuclear   IgG Scrn    This test was developed and its performance characteristics   determined by [facility]. It has not been cleared or   approved by the US Food and Drug Administration. This test   was performed in a CLIA certified laboratory and is   intended for clinical purposes.  SGPG Antibody, IgM                                0.20          IV 0.00-0.99      INTERPRETIVE INFORMATION: SGPG Antibody, IgM    The majority of sulfate-3-glucuronyl paragloboside (SGPG)   IgM-positive  sera will show reactivity against MAG. Patients who are   SGPG IgM  positive and MAG IgM negative may have multi-focal                            motor   neuropathy  with conduction block.    This test was developed and its performance characteristics   determined by [facility]. It has not been cleared or   approved by the US Food and Drug Administration. This test   was performed in a CLIA certified laboratory and is   intended for clinical purposes.  MAG Antibody, IgM Mellissa                               <1000          TU 0-999          INTERPRETIVE INFORMATION: MAG Antibody, IgM MELLISSA    An elevated IgM antibody concentration greater than 999 TU   against myelin-associated glycoprotein (MAG) suggests   active demyelination in peripheral neuropathy. A normal   concentration (less than 999 TU) generally rules out an   anti-MAG antibody-associated peripheral neuropathy.    TU=Titer Units    This test was developed and its performance characteristics   determined by [facility]. It has not been cleared or   approved by the US Food and Drug Administration. This test   was performed in  a CLIA certified laboratory and is                              intended for clinical purposes.  Performed By: [facility]  [address] [place] [id number]  : Cal Bosch MD, PhD  CLIA Number: [id number]      Lab Results   Component Value Date    WBC 6.1 02/15/2023    HGB 13.6 02/15/2023    HCT 42.6 02/15/2023     02/15/2023     07/12/2024     03/24/2023     02/15/2023    POTASSIUM 3.7 07/12/2024    POTASSIUM 4.2 03/24/2023    POTASSIUM 4.3 02/15/2023    CHLORIDE 103 07/12/2024    CHLORIDE 108 03/24/2023    CHLORIDE 106 02/15/2023    CO2 26 07/12/2024    CO2 31 03/24/2023    CO2 24 02/15/2023    BUN 12.9 07/12/2024    BUN 13 03/24/2023    BUN 13.8 02/15/2023    CR 0.65 07/12/2024    CR 0.61 03/24/2023    CR 0.61 02/15/2023    GLC 95 07/12/2024     (H) 03/24/2023     (H) 02/15/2023    AST 18 07/12/2024    AST 14 03/24/2023    ALT 10 07/12/2024    ALT 24 03/24/2023    ALKPHOS 112 07/12/2024    ALKPHOS 124 03/24/2023    BILITOTAL 0.4 07/12/2024    BILITOTAL 0.4 03/24/2023    INR 1.02 02/15/2023          MRI Brain: I personally reviewed the original images and agree with the below reports.   Reviewed.

## 2024-07-22 ENCOUNTER — TELEPHONE (OUTPATIENT)
Dept: FAMILY MEDICINE | Facility: CLINIC | Age: 19
End: 2024-07-22
Payer: COMMERCIAL

## 2024-07-22 NOTE — TELEPHONE ENCOUNTER
Patient requesting to  hat to collect urine at clinic . Hat with patient's name placed at clinic .    Patient also scheduled lab only to  supplies for second 24 hour urine collection for cortisol free urine for 7/29/24.  Deana Woodard RN

## 2024-07-24 ENCOUNTER — ANCILLARY PROCEDURE (OUTPATIENT)
Dept: ULTRASOUND IMAGING | Facility: CLINIC | Age: 19
End: 2024-07-24
Attending: PSYCHIATRY & NEUROLOGY
Payer: COMMERCIAL

## 2024-07-24 DIAGNOSIS — R20.2 PARESTHESIAS: ICD-10-CM

## 2024-07-24 PROCEDURE — 93922 UPR/L XTREMITY ART 2 LEVELS: CPT | Performed by: RADIOLOGY

## 2024-07-24 PROCEDURE — 93970 EXTREMITY STUDY: CPT | Performed by: RADIOLOGY

## 2024-07-28 PROCEDURE — 82533 TOTAL CORTISOL: CPT | Mod: 90 | Performed by: INTERNAL MEDICINE

## 2024-07-28 PROCEDURE — 99000 SPECIMEN HANDLING OFFICE-LAB: CPT | Performed by: INTERNAL MEDICINE

## 2024-07-29 ENCOUNTER — APPOINTMENT (OUTPATIENT)
Dept: LAB | Facility: CLINIC | Age: 19
End: 2024-07-29
Payer: COMMERCIAL

## 2024-07-31 DIAGNOSIS — G54.0 THORACIC OUTLET SYNDROME: Primary | ICD-10-CM

## 2024-08-01 LAB — CORTIS SAL-MCNC: <0.025 UG/DL

## 2024-08-02 DIAGNOSIS — G54.0 THORACIC OUTLET SYNDROME: Primary | ICD-10-CM

## 2024-08-09 ENCOUNTER — TELEPHONE (OUTPATIENT)
Dept: NEUROLOGY | Facility: CLINIC | Age: 19
End: 2024-08-09
Payer: COMMERCIAL

## 2024-08-09 NOTE — TELEPHONE ENCOUNTER
[facility] Call Center    Phone Message    May a detailed message be left on voicemail: no     Reason for Call: Other: Pt is requesting referral orders for physical therapy to be faxed over to Orthology     Orthology  Fax # [phone]  Phone # [phone]  Dr. Rey Massey    Action Taken: Other: Neurology    Travel Screening: Not Applicable

## 2024-08-14 ENCOUNTER — TELEPHONE (OUTPATIENT)
Dept: NEUROLOGY | Facility: CLINIC | Age: 19
End: 2024-08-14
Payer: COMMERCIAL

## 2024-08-14 NOTE — TELEPHONE ENCOUNTER
[facility] Call Center    Phone Message    May a detailed message be left on voicemail: yes     Reason for Call: Patient requests the physical therapy order be emailed to:  [email or web address]     Patient said fax is not working   Please call patient if this cannot be done    Action Taken: [facility] [place] [place] Neu[facility]    Travel Screening: Not Applicable     Date of Service:

## 2024-08-15 NOTE — TELEPHONE ENCOUNTER
I called patient. I let her know that I placed her referral in the mail this morning. Unfortunately I am not able to email referral to her due to line not being secure.

## 2024-08-15 NOTE — TELEPHONE ENCOUNTER
[facility] Call Center    Phone Message    May a detailed message be left on voicemail: yes     Reason for Call:      Patient called please send her a copy of her PT order so she can bring it with her to her PT appointment on Monday 19, 2024 morning. Patient will need her referral with or else she will have to reschedule her appointment     Email: [email or web address]    Action Taken: Message routed to:  [facility] neurology    Travel Screening: Not Applicable     Date of Service:

## 2024-08-21 PROCEDURE — 99000 SPECIMEN HANDLING OFFICE-LAB: CPT | Performed by: INTERNAL MEDICINE

## 2024-08-21 PROCEDURE — 82530 CORTISOL FREE: CPT | Mod: 90 | Performed by: INTERNAL MEDICINE

## 2024-08-22 ENCOUNTER — APPOINTMENT (OUTPATIENT)
Dept: LAB | Facility: CLINIC | Age: 19
End: 2024-08-22
Payer: COMMERCIAL

## 2024-08-26 LAB
ANNOTATION COMMENT IMP: ABNORMAL
CORTIS F 24H UR HPLC-MCNC: 11.2 UG/L
CORTIS F 24H UR-MRATE: 23 UG/D
CORTIS F/CREAT 24H UR: 13.83 UG/G CRT
CREAT 24H UR-MRATE: 1660 MG/D
CREAT UR-MCNC: 81 MG/DL

## 2024-09-11 PROCEDURE — 82533 TOTAL CORTISOL: CPT | Mod: 90 | Performed by: INTERNAL MEDICINE

## 2024-09-11 PROCEDURE — 99000 SPECIMEN HANDLING OFFICE-LAB: CPT | Performed by: INTERNAL MEDICINE

## 2024-09-13 ENCOUNTER — APPOINTMENT (OUTPATIENT)
Dept: LAB | Facility: CLINIC | Age: 19
End: 2024-09-13
Payer: COMMERCIAL

## 2024-09-19 LAB — CORTIS SAL-MCNC: <0.025 UG/DL

## 2024-10-25 DIAGNOSIS — G54.0 THORACIC OUTLET SYNDROME: Primary | ICD-10-CM

## 2024-10-31 ENCOUNTER — ANCILLARY PROCEDURE (OUTPATIENT)
Dept: ULTRASOUND IMAGING | Facility: CLINIC | Age: 19
End: 2024-10-31
Attending: FAMILY MEDICINE
Payer: COMMERCIAL

## 2024-10-31 DIAGNOSIS — R13.10 DYSPHAGIA: ICD-10-CM

## 2024-10-31 PROCEDURE — 76536 US EXAM OF HEAD AND NECK: CPT

## 2024-11-08 DIAGNOSIS — G54.0 THORACIC OUTLET SYNDROME: Primary | ICD-10-CM

## 2024-11-12 ENCOUNTER — HOSPITAL ENCOUNTER (OUTPATIENT)
Dept: MRI IMAGING | Facility: CLINIC | Age: 19
Discharge: HOME | End: 2024-11-12
Attending: PSYCHIATRY & NEUROLOGY | Admitting: PSYCHIATRY & NEUROLOGY
Payer: COMMERCIAL

## 2024-11-12 DIAGNOSIS — R20.2 PARESTHESIAS: ICD-10-CM

## 2024-11-12 PROCEDURE — A9585 GADOBUTROL INJECTION: HCPCS | Performed by: PSYCHIATRY & NEUROLOGY

## 2024-11-12 PROCEDURE — 255N000002 HC RX 255 OP 636: Performed by: PSYCHIATRY & NEUROLOGY

## 2024-11-12 PROCEDURE — 72156 MRI NECK SPINE W/O & W/DYE: CPT

## 2024-11-12 RX ORDER — GADOBUTROL 604.72 MG/ML
0.1 INJECTION INTRAVENOUS ONCE
Status: COMPLETED | OUTPATIENT
Start: 2024-11-12 | End: 2024-11-12

## 2024-11-12 RX ADMIN — GADOBUTROL 8.62 ML: 604.72 INJECTION INTRAVENOUS at 13:33

## 2024-12-02 ENCOUNTER — HOSPITAL ENCOUNTER (OUTPATIENT)
Dept: CT IMAGING | Facility: CLINIC | Age: 19
Discharge: HOME | End: 2024-12-02
Attending: PSYCHIATRY & NEUROLOGY | Admitting: PSYCHIATRY & NEUROLOGY
Payer: COMMERCIAL

## 2024-12-02 DIAGNOSIS — G54.0 THORACIC OUTLET SYNDROME: ICD-10-CM

## 2024-12-02 PROCEDURE — 250N000009 HC RX 250: Performed by: PSYCHIATRY & NEUROLOGY

## 2024-12-02 PROCEDURE — 71260 CT THORAX DX C+: CPT

## 2024-12-02 PROCEDURE — 250N000011 HC RX IP 250 OP 636: Performed by: PSYCHIATRY & NEUROLOGY

## 2024-12-02 RX ORDER — IOPAMIDOL 755 MG/ML
93 INJECTION, SOLUTION INTRAVASCULAR ONCE
Status: COMPLETED | OUTPATIENT
Start: 2024-12-02 | End: 2024-12-02

## 2024-12-02 RX ADMIN — IOPAMIDOL 93 ML: 755 INJECTION, SOLUTION INTRAVENOUS at 17:31

## 2024-12-02 RX ADMIN — SODIUM CHLORIDE 50 ML: 9 INJECTION, SOLUTION INTRAVENOUS at 17:31

## 2024-12-06 ENCOUNTER — TELEPHONE (OUTPATIENT)
Dept: OTHER | Facility: CLINIC | Age: 19
End: 2024-12-06

## 2024-12-06 NOTE — TELEPHONE ENCOUNTER
Referral received via [employer] on 12/6/24.    Referred by Rich Lancaster DO  for Paresthesias     Previous imaging completed (pertinent to referral):  CT chest, Upper ex TOS testing, cervical spine MR    Routing to scheduling to coordinate the following:    NEW VASCULAR PATIENT consult with Dr. Hill  Please schedule this at next available    Appt note:  Referred by Rich Lancaster DO  for Paresthesias     Stephanie OMALLEY, RN    [facility]  [facility]  Office: [phone]  Fax: [phone]

## 2024-12-09 ENCOUNTER — MYC MEDICAL ADVICE (OUTPATIENT)
Dept: NEUROLOGY | Facility: CLINIC | Age: 19
End: 2024-12-09
Payer: COMMERCIAL

## 2024-12-09 DIAGNOSIS — G54.0 THORACIC OUTLET SYNDROME: Primary | ICD-10-CM

## 2024-12-09 NOTE — TELEPHONE ENCOUNTER
Left voicemail for patient to call back to schedule appointment(s), provided telephone number for patient to call back to schedule.

## 2024-12-12 NOTE — TELEPHONE ENCOUNTER
Called patient to schedule, patient did not have her planner at the time of the call but took # and will call when she is ready to schedule.

## 2025-01-10 ENCOUNTER — OFFICE VISIT (OUTPATIENT)
Dept: ENDOCRINOLOGY | Facility: CLINIC | Age: 20
End: 2025-01-10
Payer: COMMERCIAL

## 2025-01-10 VITALS
WEIGHT: 193.1 LBS | DIASTOLIC BLOOD PRESSURE: 77 MMHG | SYSTOLIC BLOOD PRESSURE: 117 MMHG | BODY MASS INDEX: 29.37 KG/M2 | HEART RATE: 94 BPM | OXYGEN SATURATION: 99 %

## 2025-01-10 DIAGNOSIS — E27.0 HYPERCORTISOLEMIA: Primary | ICD-10-CM

## 2025-01-10 DIAGNOSIS — R25.1 TREMOR: ICD-10-CM

## 2025-01-10 PROBLEM — G54.0 THORACIC OUTLET SYNDROME: Status: ACTIVE | Noted: 2022-04-21

## 2025-01-10 PROBLEM — R79.89 ELEVATED CORTISOL LEVEL: Status: ACTIVE | Noted: 2022-10-11

## 2025-01-10 PROBLEM — R53.82 CHRONIC FATIGUE: Status: ACTIVE | Noted: 2022-10-05

## 2025-01-10 PROBLEM — G90.A POSTURAL ORTHOSTATIC TACHYCARDIA SYNDROME (POTS): Status: ACTIVE | Noted: 2022-04-21

## 2025-01-10 PROBLEM — R79.89 ELEVATED PROLACTIN LEVEL: Status: ACTIVE | Noted: 2022-10-11

## 2025-01-10 PROBLEM — Z86.39 HISTORY OF CUSHING'S SYNDROME: Status: ACTIVE | Noted: 2022-04-21

## 2025-01-10 PROBLEM — G62.9 SMALL FIBER NEUROPATHY: Status: ACTIVE | Noted: 2022-04-21

## 2025-01-10 PROBLEM — F51.01 PRIMARY INSOMNIA: Status: ACTIVE | Noted: 2022-10-05

## 2025-01-10 PROCEDURE — 99214 OFFICE O/P EST MOD 30 MIN: CPT | Performed by: INTERNAL MEDICINE

## 2025-01-10 NOTE — NURSING NOTE
Eloisa Rene's goals for this visit include:   Chief Complaint   Patient presents with    Endocrine Problem     Hypercortisolemia and elevate prolactin     She requests these members of her care team be copied on today's visit information: No    PCP: [facility]    Referring Provider:  Daisy Lucero MD  [address] [place] [id number]    /77 (BP Location: Left arm, Patient Position: Sitting, Cuff Size: Adult Regular)   Pulse 94   Wt 87.6 kg (193 lb 1.6 oz)   SpO2 99%   BMI 29.37 kg/m      Do you need any medication refills at today's visit? No

## 2025-01-10 NOTE — PROGRESS NOTES
- Endocrinology Follow up -    Reason for visit/consult:  Elevated cortisol level    Primary care provider: Clinic, [facility]      Assessment and Plan  19 year old female with elevated cortisol level.    Initial referral was suspected ACTH dependent Cushing's syndrome (with a few lab with hypercortisolemia (morning cortisol and high ACTH) in fall 2022, outside facility), but MRI no obvious lesion, inferior petrosal sinus sampling was also negative, so no evidence of Cushing, no biochemical evidence of hypercortisolemia despite repeating blood works at this point.   Also clinically, her main physical symptoms are more limited to upper extremities, which can be against to general hypercortisolemia symptoms (shoulder/arm tremor but no leg weakness etc)    - since we had a previous history of high ACTH and cortisol, we will place lab orders to check (am cortisol and ACTH) as a soft monitor, but at this point (this year 2025), we will take off from intense regular monitoring of cortisol, and she will focus on her thoracic outlet and possibly its related symptoms.          RTC with me in once a year       30 minutes spent on the date of the encounter doing chart review, history and exam, documentation and further activities as noted above.    The longitudinal plan of care for the diagnosis(es)/condition(s) as documented were addressed during this visit. Due to the added complexity in care, I will continue to support Amin in the subsequent management and with ongoing continuity of care.     Daisy Lucero MD  Staff Physician  Endocrinology and Metabolism  [facility]  License: [id number]  Pager: [phone]         Summary of Cortisol testing:  - Salivary Cortisol Testing  11/03/2022- cortisol salvia <50  11/15/2022 - coritsol saliva - <0.012  12/01/1011 - cortisol saliva - 0.030      - Urinary Cortisol Testing  10/10/2022 - Cortisol urine 24 hour - 40 (3.5-45 mcg)/24 hr  10/13/2022 - cortisol  urine 24 hour - 55 (3.5 -45 mcg)/24 hour - high  11/15/2022 - cortisol free urine 16.50 micrograms/L       - 1mg/8mg Dexamethasone suppression testing  10/06/2022 - Cortisol (serum?) 29.36 (3.44 - 22.45 micrograms/dl)  10/14/2022 - cortisol serum 7.10 (3.44-22.45 micrograms/dL)  10/17/2022- cortisol serum 0.61 (3.44-22.45 micrograms/dL)  10/19/2022 - cortisol 2.24 (3.44 -22.45 micrograms/dL)  11/14/2022 - cortisol serum 19.7    - random cortisol ACTH  10/06/2022 - ACTH  178 (high)  10/19/2022 - ACTH - 18 (normal)    Interval History as of 1/15/2025 : Patient and her father came, and mentioned recently diagnosed possible thoracic outlet syndrome and she is going to meet thoracic surgeon soon. She mentioned she still has shoulder/arm tremors.    Interval History as of 7/18/2024 : Patient has still same, weight gain despite intense life style modification, sleep patter off, awake at night.  Interval History as of 10/13/2023: Patient endorses same symptoms of weakness, fatigue, dizziness, light sensitivity, and anxiety all of which are greatly affecting her quality of life. Patient had taken metformin for a few months after the last visit but stopped due to GI issues. Over the last year, she has been working with a neurologist Dr. Vergara at [facility] and was diagnosed with POTS and small fiber neuropathy which has been hypothesized to be from her suspected Cushing's.   Interval History as of 3/24/2023 : Patient has been not feeling well, she has multiple symptoms including weakness, fatigue, dizziness, light sensitivity, anxiety, all of which significantly affecting her quality of life. BP has been higher side. she also mentioned some possible lactation from her right nipple recently.  HPI: A 18 yo female here for the second opinion of hypercortisolemia. Referral from Dr. Childs.   Patient father accompanied the session.  Patient described since April 2022 she started to feel multiple weird symptoms.  She was working  [employer] at that time and the day after she had a work she started to have feeling shaky, which has been worsening day by day.  She described she feel intermittently more, some vibration sensation inside of her body.  Also she mentioned some palpitations, especially when she is resting at night.  Symptom alleviated after multiple eating of salt and crackers.  She has polyuria started April 2022 she said approximately 4 times a night.  Also she mentioned she has vertigo quite often and she described she feels off balance.  Regarding her palpitation she also completed cardiology work-up in end of summer 2022 including 7 days monitoring and average heart rate was 87 and she was told possible sinus tachycardia without any remarkable arrhythmia.   She recalls since February 2021, she has repeated symptoms of urinary tract infection. Urine culture results 4 times and each time bacteria negative.   She also mentioned she experiences episode of right side of the eye and cheek drop but no numbness and the last few hours, she has been experience this episode approximately twice a week.   She also mentioned she had a significant weight gain since April 2022 she recalls her body weight is around 160s and today her body weight is 195 pounds.  She already visited multiple specialties primary doctor, cardiology, she was referred to endocrinologist Dr. Childs, where hypercholesterolemia work-up has been done intensively.   Random cortisol 10:00 in the morning was 29 with ACTH 178.  She underwent dexamethasone test 3 times.  First around 1 mg dexamethasone suppression test was 7.1 elevated, on October 17, 2022 she underwent 8 mg dexamethasone suppression test which was appropriately suppressed at 0.61.  She was repeated 1 mg dexamethasone suppression test on October 19, 2022 which shows borderline suppressed 2.24.  She underwent urinary free cortisol 24-hour test initially  October 10, 2022 was 40 with upper limit of 45,  "second test was done October 13, 2022 which has mildly elevated to 55.  She underwent salivary cortisol test to twice in the role first test was on November 2, 2022 and second test was November 30, 2022, she kept a 7 pound 12 reiterate her\" altogether and both of which normal range.   She also underwent brain MRI on October 28, 2022 which was read as normal no significant pituitary gland.   She also mentioned that her blood pressure has been mildly elevated recently and today 137/84 with pulse 82.  She also mentioned her glucose mildly elevated on October 6, 2022 fasting glucose was 102 .  Her prolactin level has been mildly elevated 30s-40.  Her menarche age 12 her menstrual cycle has been recently 28 to 35 days cycle she recently on birth control pills since July 2021 .  Denied hirsutism .  She also has depression and panic attack for 1.5 years but she described past several months get worse  .she came here for the second opinion today.   She also described her weight gain she noticed more abdominal fat accumulation.  She also mentioned difficult healing from minor injury.  She has some few acnes on her face.  She also mentioned she noticed some faint red stria around her stomach and axillary area.       Encounters  - from Last 3 Months    Past Medical/Surgical History:  No past medical history on file.  Past Surgical History:   Procedure Laterality Date    IR CAROTID CEREBRAL ANGIOGRAM BILATERAL  2/15/2023       Allergies:  Allergies   Allergen Reactions    Amphetamine-Dextroamphet Er Other (See Comments)     She did not feel well while taking     Desogestrel-Ethinyl Estradiol Unknown    Drospirenone-Ethinyl Estradiol Other (See Comments)     Suicidal thoughts     Fluoxetine Other (See Comments)     Suicidal thoughts        Current Medications   Current Outpatient Medications   Medication Sig Dispense Refill    ibuprofen (ADVIL/MOTRIN) 200 MG tablet Take 600 mg by mouth every 6 hours as needed for pain      " diazepam (VALIUM) 5 MG tablet Take 1 tablet (5 mg) by mouth once as needed for anxiety Take 1 tablet before MRI (Patient not taking: Reported on 10/13/2023) 2 tablet 1    metFORMIN (GLUCOPHAGE) 500 MG tablet Take 1 tablet (500 mg) by mouth 2 times daily (with meals) (Patient not taking: Reported on 10/13/2023) 180 tablet 3     No current facility-administered medications for this visit.       Family History:  Family History   Problem Relation Age of Onset    Hypertension Mother     Hypertension Father     Diabetes Father     Congenital Anomalies Brother         Down's Syndrome       Social History:  Social History     Tobacco Use    Smoking status: Never    Smokeless tobacco: Never   Substance Use Topics    Alcohol use: Not on file       ROS:  Full review of systems taken with the help of the intake sheet. Otherwise a complete 14 point review of systems was taken and is negative unless stated in the history above.      Physical Exam:   Vitals: /77 (BP Location: Left arm, Patient Position: Sitting, Cuff Size: Adult Regular)   Pulse 94   Wt 87.6 kg (193 lb 1.6 oz)   SpO2 99%   BMI 29.37 kg/m    BMI= Body mass index is 29.37 kg/m .   General: well appearing, no acute distress, pleasant and conversant  Mental Status/neuro: alert and oriented  Face: symmetrical, pink facial color, no hirsutism  Eyes: anicteric,, no proptosis or lid lag  Resp: normally breathing  Thighs: thin purple stretch marks  Legs: no swelling or edema        Labs : I reviewed data from epic and extract and summarize the pertinent data here.     - overview of cortisol testing is under interval history    Lab on 09/15/2023   Component Date Value Ref Range Status    See Scanned Result 09/15/2023 Specimen received. Reordered and sent to performing laboratory. Report to follow up on completion.   Final    Performing Laboratory 09/15/2023 [email or web address]   Final    Test Name 09/15/2023 Sensory Neuropathy antibody panel with reflex to titer and  neuronal [email or web address]   Final    Test Code 09/15/2023 [id number]   Final    Vasopressin 09/15/2023 3.7  0.0 - 6.9 pg/mL Final    INTERPRETIVE INFORMATION: Arginine Vasopressin Hormone    This test was developed and its performance characteristics   determined by [facility]. It has not been cleared or   approved by the [place] Food and Drug Administration. This test   was performed in a CLIA certified laboratory and is   intended for clinical purposes.  Performed By: [facility]  [address] [place] [address]  : Cal Bosch MD, PhD  CLIA Number: [id number]    DNA (ds) Antibody 09/15/2023 3.1  <10.0 IU/mL Final    Negative    RNA Polymerase III Antibody, IgG 09/15/2023 3  0 - 19 Units Final    Comment: INTERPRETIVE INFORMATION: RNA Polymerase III Antibody, IgG      19 Units or less ......Negative    20 - 39 Units .........Weak Positive    40 - 80 Units .........Moderate Positive    81 Units or greater ...Strong Positive    The presence of RNA polymerase III IgG antibody, when   considered in conjunction with other laboratory and   clinical findings, is an aid in the diagnosis of systemic   sclerosis (SSc) with increased incidence of skin   involvement and renal crisis with the diffuse cutaneous   form of SSc. RNA polymerase III IgG antibody occur in about   11-23 percent of SSc patients, and typically in the absence   of anti-centromere and anti-Scl-70 antibodies.    A negative result indicates no detectable IgG antibodies to   the dominant antigen of RNA polymerase III and does not   rule out the possibility of SSc. False-positive results may   also occur due to non-specific binding of immune complexes.   Strong clinical correlation is recommended.    If clinical suspicion remains,                            consider additional testing   for other antibodies associated with SSc, including   centromere, Scl-70, U3-RNP, PM/Scl, or Th/To.  Performed By: [facility]  [address]  [place] [address]  : Cal Bosch MD, PhD  CLIA Number: [id number]    Centromere Vandana IgG Instrument Value 09/15/2023 <0.7  <7.0 U/mL Final    Centromere Antibody IgG 09/15/2023 Negative  Negative Final    Scl-70 Vandana IgG Instrument Value 09/15/2023 <0.6  <7.0 U/mL Final    Scl-70 Antibody IgG 09/15/2023 Negative  Negative Final    RNP Vandana IgG Instrument Value 09/15/2023 1.4  <5.0 U/mL Final    RNP Antibody IgG 09/15/2023 Negative  Negative Final    Floyd DAISY Vandana IgG Instrument Value 09/15/2023 <0.7  <7.0 U/mL Final    Floyd DAISY Antibody IgG 09/15/2023 Negative  Negative Final    SSA Vandana IgG Instrument Value 09/15/2023 <0.5  <7.0 U/mL Final    SSA (Ro) Antibody IgG 09/15/2023 Negative  Negative Final    SSB Vandana IgG Instrument Value 09/15/2023 <0.6  <7.0 U/mL Final    SSB (La) Antibody IgG 09/15/2023 Negative  Negative Final    RAFAEL interpretation 09/15/2023 Negative  Negative Final      Negative:              <1:40  Borderline Positive:   1:40 - 1:80  Positive:              >1:80    Immunoglobulin M 09/15/2023 185  35 - 242 mg/dL Final    Immunoglobulin A 09/15/2023 353  84 - 499 mg/dL Final    Immunoglobulin G 09/15/2023 1,144  610 - 1,616 mg/dL Final    Immunofixation ELP 09/15/2023 No monoclonal protein seen on immunofixation. Pathologic significance requires clinical correlation. BERNARD Ruff M.D., Ph.D., Pathologist   Final    Miscellaneous Test 09/15/2023 SEE NOTE   Final    Comment: Test name                    Result Flag  Units  RefIntvl  ------------------------------------------------------------  Purkinje Cell/Neuronal Nuclear IgG Scrn                        None Detected             None Detected    SAM-1, SAM-2, PCCA-1 or PCCA-Tr(DNER) antibodies not   detected. No further testing will be performed.  INTERPRETIVE INFORMATION: Purkinje Cell/Neuronal Nuclear   IgG Scrn    This test was developed and its performance characteristics   determined by [facility]. It has not been cleared or   approved by the [place] Food and Drug Administration. This test   was performed in a CLIA certified laboratory and is   intended for clinical purposes.  SGPG Antibody, IgM                                0.20          IV 0.00-0.99      INTERPRETIVE INFORMATION: SGPG Antibody, IgM    The majority of sulfate-3-glucuronyl paragloboside (SGPG)   IgM-positive  sera will show reactivity against MAG. Patients who are   SGPG IgM  positive and MAG IgM negative may have multi-focal                            motor   neuropathy  with conduction block.    This test was developed and its performance characteristics   determined by [facility]. It has not been cleared or   approved by the US Food and Drug Administration. This test   was performed in a CLIA certified laboratory and is   intended for clinical purposes.  MAG Antibody, IgM Mellissa                               <1000          TU 0-999          INTERPRETIVE INFORMATION: MAG Antibody, IgM MELLISSA    An elevated IgM antibody concentration greater than 999 TU   against myelin-associated glycoprotein (MAG) suggests   active demyelination in peripheral neuropathy. A normal   concentration (less than 999 TU) generally rules out an   anti-MAG antibody-associated peripheral neuropathy.    TU=Titer Units    This test was developed and its performance characteristics   determined by [facility]. It has not been cleared or   approved by the [place] Food and Drug Administration. This test   was performed in a CLIA certified laboratory and is                              intended for clinical purposes.  Performed By: [facility]  [address] [place] [id number]  : Cal Bosch MD, PhD  CLIA Number: [id number]      Lab Results   Component Value Date    WBC 6.1 02/15/2023    HGB 13.6 02/15/2023    HCT 42.6 02/15/2023     02/15/2023     07/12/2024     03/24/2023     02/15/2023     POTASSIUM 3.7 07/12/2024    POTASSIUM 4.2 03/24/2023    POTASSIUM 4.3 02/15/2023    CHLORIDE 103 07/12/2024    CHLORIDE 108 03/24/2023    CHLORIDE 106 02/15/2023    CO2 26 07/12/2024    CO2 31 03/24/2023    CO2 24 02/15/2023    BUN 12.9 07/12/2024    BUN 13 03/24/2023    BUN 13.8 02/15/2023    CR 0.65 07/12/2024    CR 0.61 03/24/2023    CR 0.61 02/15/2023    GLC 95 07/12/2024     (H) 03/24/2023     (H) 02/15/2023    AST 18 07/12/2024    AST 14 03/24/2023    ALT 10 07/12/2024    ALT 24 03/24/2023    ALKPHOS 112 07/12/2024    ALKPHOS 124 03/24/2023    BILITOTAL 0.4 07/12/2024    BILITOTAL 0.4 03/24/2023    INR 1.02 02/15/2023          MRI Brain: I personally reviewed the original images and agree with the below reports.   Reviewed.

## 2025-01-10 NOTE — LETTER
1/10/2025      Eloisa Rene  [address]  [facility] [address]      Dear Colleague,    Thank you for referring your patient, Eloisa Rene, to the [facility]. Please see a copy of my visit note below.    - Endocrinology Follow up -    Reason for visit/consult:  Elevated cortisol level    Primary care provider: Clinic, [facility]      Assessment and Plan  19 year old female with elevated cortisol level.    Initial referral was suspected ACTH dependent Cushing's syndrome (with a few lab with hypercortisolemia (morning cortisol and high ACTH) in fall 2022, outside facility), but MRI no obvious lesion, inferior petrosal sinus sampling was also negative, so no evidence of Cushing, no biochemical evidence of hypercortisolemia despite repeating blood works at this point.   Also clinically, her main physical symptoms are more limited to upper extremities, which can be against to general hypercortisolemia symptoms (shoulder/arm tremor but no leg weakness etc)    - since we had a previous history of high ACTH and cortisol, we will place lab orders to check (am cortisol and ACTH) as a soft monitor, but at this point (this year 2025), we will take off from intense regular monitoring of cortisol, and she will focus on her thoracic outlet and possibly its related symptoms.          RTC with me in once a year       30 minutes spent on the date of the encounter doing chart review, history and exam, documentation and further activities as noted above.    The longitudinal plan of care for the diagnosis(es)/condition(s) as documented were addressed during this visit. Due to the added complexity in care, I will continue to support Eloisa in the subsequent management and with ongoing continuity of care.     Daisy Lucero MD  Staff Physician  Endocrinology and Metabolism  [facility]  License: [id number]  Pager: [phone]         Summary of Cortisol testing:  - Salivary Cortisol  Testing  11/03/2022- cortisol salvia <50  11/15/2022 - coritsol saliva - <0.012  12/01/1011 - cortisol saliva - 0.030      - Urinary Cortisol Testing  10/10/2022 - Cortisol urine 24 hour - 40 (3.5-45 mcg)/24 hr  10/13/2022 - cortisol urine 24 hour - 55 (3.5 -45 mcg)/24 hour - high  11/15/2022 - cortisol free urine 16.50 micrograms/L       - 1mg/8mg Dexamethasone suppression testing  10/06/2022 - Cortisol (serum?) 29.36 (3.44 - 22.45 micrograms/dl)  10/14/2022 - cortisol serum 7.10 (3.44-22.45 micrograms/dL)  10/17/2022- cortisol serum 0.61 (3.44-22.45 micrograms/dL)  10/19/2022 - cortisol 2.24 (3.44 -22.45 micrograms/dL)  11/14/2022 - cortisol serum 19.7    - random cortisol ACTH  10/06/2022 - ACTH  178 (high)  10/19/2022 - ACTH - 18 (normal)    Interval History as of 1/15/2025 : Patient and her father came, and mentioned recently diagnosed possible thoracic outlet syndrome and she is going to meet thoracic surgeon soon. She mentioned she still has shoulder/arm tremors.    Interval History as of 7/18/2024 : Patient has still same, weight gain despite intense life style modification, sleep patter off, awake at night.  Interval History as of 10/13/2023: Patient endorses same symptoms of weakness, fatigue, dizziness, light sensitivity, and anxiety all of which are greatly affecting her quality of life. Patient had taken metformin for a few months after the last visit but stopped due to GI issues. Over the last year, she has been working with a neurologist Dr. Vergara at [facility] and was diagnosed with POTS and small fiber neuropathy which has been hypothesized to be from her suspected Cushing's.   Interval History as of 3/24/2023 : Patient has been not feeling well, she has multiple symptoms including weakness, fatigue, dizziness, light sensitivity, anxiety, all of which significantly affecting her quality of life. BP has been higher side. she also mentioned some possible lactation from her right nipple recently.  HPI:  A 16 yo female here for the second opinion of hypercortisolemia. Referral from Dr. Childs.   Patient father accompanied the session.  Patient described since April 2022 she started to feel multiple weird symptoms.  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  She described she feel intermittently more, some vibration sensation inside of her body.  Also she mentioned some palpitations, especially when she is resting at night.  Symptom alleviated after multiple eating of salt and crackers.  She has polyuria started April 2022 she said approximately 4 times a night.  Also she mentioned she has vertigo quite often and she described she feels off balance.  Regarding her palpitation she also completed cardiology work-up in end of summer 2022 including 7 days monitoring and average heart rate was 87 and she was told possible sinus tachycardia without any remarkable arrhythmia.   She recalls since February 2021, she has repeated symptoms of urinary tract infection. Urine culture results 4 times and each time bacteria negative.   She also mentioned she experiences episode of right side of the eye and cheek drop but no numbness and the last few hours, she has been experience this episode approximately twice a week.   She also mentioned she had a significant weight gain since April 2022 she recalls her body weight is around 160s and today her body weight is 195 pounds.  She already visited multiple specialties primary doctor, cardiology, she was referred to endocrinologist Dr. Childs, where hypercholesterolemia work-up has been done intensively.   Random cortisol 10:00 in the morning was 29 with ACTH 178.  She underwent dexamethasone test 3 times.  First around 1 mg dexamethasone suppression test was 7.1 elevated, on October 17, 2022 she underwent 8 mg dexamethasone suppression test which was appropriately suppressed at  "0.61.  She was repeated 1 mg dexamethasone suppression test on October 19, 2022 which shows borderline suppressed 2.24.  She underwent urinary free cortisol 24-hour test initially  October 10, 2022 was 40 with upper limit of 45, second test was done October 13, 2022 which has mildly elevated to 55.  She underwent salivary cortisol test to twice in the role first test was on November 2, 2022 and second test was November 30, 2022, she kept a 7 pound 12 reiterate her\" altogether and both of which normal range.   She also underwent brain MRI on October 28, 2022 which was read as normal no significant pituitary gland.   She also mentioned that her blood pressure has been mildly elevated recently and today 137/84 with pulse 82.  She also mentioned her glucose mildly elevated on October 6, 2022 fasting glucose was 102 .  Her prolactin level has been mildly elevated 30s-40.  Her menarche age 12 her menstrual cycle has been recently 28 to 35 days cycle she recently on birth control pills since July 2021 .  Denied hirsutism .  She also has depression and panic attack for 1.5 years but she described past several months get worse  .she came here for the second opinion today.   She also described her weight gain she noticed more abdominal fat accumulation.  She also mentioned difficult healing from minor injury.  She has some few acnes on her face.  She also mentioned she noticed some faint red stria around her stomach and axillary area.       Encounters  - from Last 3 Months    Past Medical/Surgical History:  No past medical history on file.  Past Surgical History:   Procedure Laterality Date     IR CAROTID CEREBRAL ANGIOGRAM BILATERAL  2/15/2023       Allergies:  Allergies   Allergen Reactions     Amphetamine-Dextroamphet Er Other (See Comments)     She did not feel well while taking      Desogestrel-Ethinyl Estradiol Unknown     Drospirenone-Ethinyl Estradiol Other (See Comments)     Suicidal thoughts      Fluoxetine Other " (See Comments)     Suicidal thoughts        Current Medications   Current Outpatient Medications   Medication Sig Dispense Refill     ibuprofen (ADVIL/MOTRIN) 200 MG tablet Take 600 mg by mouth every 6 hours as needed for pain       diazepam (VALIUM) 5 MG tablet Take 1 tablet (5 mg) by mouth once as needed for anxiety Take 1 tablet before MRI (Patient not taking: Reported on 10/13/2023) 2 tablet 1     metFORMIN (GLUCOPHAGE) 500 MG tablet Take 1 tablet (500 mg) by mouth 2 times daily (with meals) (Patient not taking: Reported on 10/13/2023) 180 tablet 3     No current facility-administered medications for this visit.       Family History:  Family History   Problem Relation Age of Onset     Hypertension Mother      Hypertension Father      Diabetes Father      Congenital Anomalies Brother         Down's Syndrome       Social History:  Social History     Tobacco Use     Smoking status: Never     Smokeless tobacco: Never   Substance Use Topics     Alcohol use: Not on file       ROS:  Full review of systems taken with the help of the intake sheet. Otherwise a complete 14 point review of systems was taken and is negative unless stated in the history above.      Physical Exam:   Vitals: /77 (BP Location: Left arm, Patient Position: Sitting, Cuff Size: Adult Regular)   Pulse 94   Wt 87.6 kg (193 lb 1.6 oz)   SpO2 99%   BMI 29.37 kg/m    BMI= Body mass index is 29.37 kg/m .   General: well appearing, no acute distress, pleasant and conversant  Mental Status/neuro: alert and oriented  Face: symmetrical, pink facial color, no hirsutism  Eyes: anicteric,, no proptosis or lid lag  Resp: normally breathing  Thighs: thin purple stretch marks  Legs: no swelling or edema        Labs : I reviewed data from epic and extract and summarize the pertinent data here.     - overview of cortisol testing is under interval history    Lab on 09/15/2023   Component Date Value Ref Range Status     See Scanned Result 09/15/2023 Specimen  received. Reordered and sent to performing laboratory. Report to follow up on completion.   Final     Performing Laboratory 09/15/2023 [facility]   Final     Test Name 09/15/2023 Sensory Neuropathy antibody panel with reflex to titer and neuronal immublot   Final     Test Code 09/15/2023 [id number]   Final     Vasopressin 09/15/2023 3.7  0.0 - 6.9 pg/mL Final    INTERPRETIVE INFORMATION: Arginine Vasopressin Hormone    This test was developed and its performance characteristics   determined by [facility]. It has not been cleared or   approved by the US Food and Drug Administration. This test   was performed in a CLIA certified laboratory and is   intended for clinical purposes.  Performed By: [facility]  [address] [place] [id number]  : Cal Bosch MD, PhD  CLIA Number: [id number]     DNA (ds) Antibody 09/15/2023 3.1  <10.0 IU/mL Final    Negative     RNA Polymerase III Antibody, IgG 09/15/2023 3  0 - 19 Units Final    Comment: INTERPRETIVE INFORMATION: RNA Polymerase III Antibody, IgG      19 Units or less ......Negative    20 - 39 Units .........Weak Positive    40 - 80 Units .........Moderate Positive    81 Units or greater ...Strong Positive    The presence of RNA polymerase III IgG antibody, when   considered in conjunction with other laboratory and   clinical findings, is an aid in the diagnosis of systemic   sclerosis (SSc) with increased incidence of skin   involvement and renal crisis with the diffuse cutaneous   form of SSc. RNA polymerase III IgG antibody occur in about   11-23 percent of SSc patients, and typically in the absence   of anti-centromere and anti-Scl-70 antibodies.    A negative result indicates no detectable IgG antibodies to   the dominant antigen of RNA polymerase III and does not   rule out the possibility of SSc. False-positive results may   also occur due to non-specific binding of immune complexes.   Strong clinical correlation  is recommended.    If clinical suspicion remains,                            consider additional testing   for other antibodies associated with SSc, including   centromere, Scl-70, U3-RNP, PM/Scl, or Th/To.  Performed By: [facility]  [address] [place] [address]  : Cal Bosch MD, PhD  CLIA Number: [id number]     Centromere Vandana IgG Instrument Value 09/15/2023 <0.7  <7.0 U/mL Final     Centromere Antibody IgG 09/15/2023 Negative  Negative Final     Scl-70 Vandana IgG Instrument Value 09/15/2023 <0.6  <7.0 U/mL Final     Scl-70 Antibody IgG 09/15/2023 Negative  Negative Final     RNP Vandana IgG Instrument Value 09/15/2023 1.4  <5.0 U/mL Final     RNP Antibody IgG 09/15/2023 Negative  Negative Final     Floyd DAISY Vandana IgG Instrument Value 09/15/2023 <0.7  <7.0 U/mL Final     Floyd DAISY Antibody IgG 09/15/2023 Negative  Negative Final     SSA Vandana IgG Instrument Value 09/15/2023 <0.5  <7.0 U/mL Final     SSA (Ro) Antibody IgG 09/15/2023 Negative  Negative Final     SSB Vandana IgG Instrument Value 09/15/2023 <0.6  <7.0 U/mL Final     SSB (La) Antibody IgG 09/15/2023 Negative  Negative Final     RAFAEL interpretation 09/15/2023 Negative  Negative Final      Negative:              <1:40  Borderline Positive:   1:40 - 1:80  Positive:              >1:80     Immunoglobulin M 09/15/2023 185  35 - 242 mg/dL Final     Immunoglobulin A 09/15/2023 353  84 - 499 mg/dL Final     Immunoglobulin G 09/15/2023 1,144  610 - 1,616 mg/dL Final     Immunofixation ELP 09/15/2023 No monoclonal protein seen on immunofixation. Pathologic significance requires clinical correlation. BERNARD Ruff M.D., Ph.D., Pathologist   Final     Miscellaneous Test 09/15/2023 SEE NOTE   Final    Comment: Test name                    Result Flag  Units  RefIntvl  ------------------------------------------------------------  Purkinje Cell/Neuronal Nuclear IgG Scrn                        None Detected             None  Detected    SAM-1, SAM-2, PCCA-1 or PCCA-Tr(DNER) antibodies not   detected. No further testing will be performed.  INTERPRETIVE INFORMATION: Purkinje Cell/Neuronal Nuclear   IgG Scrn    This test was developed and its performance characteristics   determined by [facility]. It has not been cleared or   approved by the US Food and Drug Administration. This test   was performed in a CLIA certified laboratory and is   intended for clinical purposes.  SGPG Antibody, IgM                                0.20          IV 0.00-0.99      INTERPRETIVE INFORMATION: SGPG Antibody, IgM    The majority of sulfate-3-glucuronyl paragloboside (SGPG)   IgM-positive  sera will show reactivity against MAG. Patients who are   SGPG IgM  positive and MAG IgM negative may have multi-focal                            motor   neuropathy  with conduction block.    This test was developed and its performance characteristics   determined by [facility]. It has not been cleared or   approved by the US Food and Drug Administration. This test   was performed in a CLIA certified laboratory and is   intended for clinical purposes.  MAG Antibody, IgM Mellissa                               <1000          TU 0-999          INTERPRETIVE INFORMATION: MAG Antibody, IgM MELLISSA    An elevated IgM antibody concentration greater than 999 TU   against myelin-associated glycoprotein (MAG) suggests   active demyelination in peripheral neuropathy. A normal   concentration (less than 999 TU) generally rules out an   anti-MAG antibody-associated peripheral neuropathy.    TU=Titer Units    This test was developed and its performance characteristics   determined by [facility]. It has not been cleared or   approved by the US Food and Drug Administration. This test   was performed in a CLIA certified laboratory and is                              intended for clinical purposes.  Performed By: [facility]  [address] [place] [address]  [id number]  : Cal Bosch MD, PhD  CLIA Number: [id number]      Lab Results   Component Value Date    WBC 6.1 02/15/2023    HGB 13.6 02/15/2023    HCT 42.6 02/15/2023     02/15/2023     07/12/2024     03/24/2023     02/15/2023    POTASSIUM 3.7 07/12/2024    POTASSIUM 4.2 03/24/2023    POTASSIUM 4.3 02/15/2023    CHLORIDE 103 07/12/2024    CHLORIDE 108 03/24/2023    CHLORIDE 106 02/15/2023    CO2 26 07/12/2024    CO2 31 03/24/2023    CO2 24 02/15/2023    BUN 12.9 07/12/2024    BUN 13 03/24/2023    BUN 13.8 02/15/2023    CR 0.65 07/12/2024    CR 0.61 03/24/2023    CR 0.61 02/15/2023    GLC 95 07/12/2024     (H) 03/24/2023     (H) 02/15/2023    AST 18 07/12/2024    AST 14 03/24/2023    ALT 10 07/12/2024    ALT 24 03/24/2023    ALKPHOS 112 07/12/2024    ALKPHOS 124 03/24/2023    BILITOTAL 0.4 07/12/2024    BILITOTAL 0.4 03/24/2023    INR 1.02 02/15/2023          MRI Brain: I personally reviewed the original images and agree with the below reports.   Reviewed.               Again, thank you for allowing me to participate in the care of your patient.        Sincerely,        Daisy Lucero MD    Electronically signed

## 2025-01-15 ENCOUNTER — TELEPHONE (OUTPATIENT)
Dept: ENDOCRINOLOGY | Facility: CLINIC | Age: 20
End: 2025-01-15
Payer: COMMERCIAL

## 2025-01-15 NOTE — TELEPHONE ENCOUNTER
Writer spoke with patient. She has appointment tomorrow (1/16/25) with [facility] provider and requesting the notes to be completed.       Writer reviewed encounter from 1/10/2025 and chart notes are not signed at this time.     Sent request to provider to please finish encounter notes and sign.   [facility] provider can access via Care Everywhere.    Katelyn Bernal CMA  Adult Endocrinology   [facility]

## 2025-01-15 NOTE — TELEPHONE ENCOUNTER
[facility] Call Center    Phone Message    May a detailed message be left on voicemail: yes     Reason for Call: Other: Pt requesting call back. Pt called and stated there are no notes from her appt with Dr. Lucero on 1/10. Pt is needing those for a vascular appt the pt has tomorrow

## 2025-01-21 NOTE — TELEPHONE ENCOUNTER
RECORDS RECEIVED FROM: Care Everywhere   REASON FOR VISIT: Jugular vein stenosis/ TOS   PROVIDER: Doni Iraheta MD   DATE OF APPT: 2/19/25 @ 4:30 pm    NOTES (FOR ALL VISITS) STATUS DETAILS   OFFICE NOTE from referring provider Care Everywhere 1/16/25 [facility]     OFFICE NOTE from other specialist Internal 12/6/24, 5/23/24 Rich Lancaster DO @[facility]     [facility] Care Everywhere [facility]  3/23/23 EMG     MEDICATION LIST Internal    IMAGING  (FOR ALL VISITS)     IR Internal [facility]  2/15/23 IR Carotid Cerebral Angio Bilat     ULTRASOUND (CAROTID BILAT) *VASCULAR* Internal [facility]  7/24/24 US Up Ext [facility] [employer] Floyd & PPG Thor Outlet Syn Bilat     MRI (HEAD, NECK, SPINE) Internal [facility]  11/12/24 MR Cervical Spine     CT (HEAD, NECK, SPINE) Internal [facility]  12/2/24 CT Chest  4/18/23 CT Chest/Abdomen/Pelvis

## 2025-02-04 ENCOUNTER — TELEPHONE (OUTPATIENT)
Dept: ENDOCRINOLOGY | Facility: CLINIC | Age: 20
End: 2025-02-04
Payer: COMMERCIAL

## 2025-02-04 NOTE — TELEPHONE ENCOUNTER
Left Voicemail (1st Attempt) for the patient to call back and schedule the following:    Appointment type: return  Provider: dr. Lucero   Return date: jan 2026   Specialty phone number: [phone]   Additonal Notes: 1 year follow up     [facility]   Orthopedics, Podiatry, Sports Medicine, Ent ,Eye , Audiology, Adult Endocrine & Diabetes, Nutrition & Medication Therapy Management Specialties   [facility]

## 2025-02-19 ENCOUNTER — OFFICE VISIT (OUTPATIENT)
Dept: NEUROLOGY | Facility: CLINIC | Age: 20
End: 2025-02-19
Payer: COMMERCIAL

## 2025-02-19 ENCOUNTER — PRE VISIT (OUTPATIENT)
Dept: NEUROLOGY | Facility: CLINIC | Age: 20
End: 2025-02-19

## 2025-02-19 VITALS
HEART RATE: 125 BPM | OXYGEN SATURATION: 99 % | SYSTOLIC BLOOD PRESSURE: 134 MMHG | RESPIRATION RATE: 16 BRPM | DIASTOLIC BLOOD PRESSURE: 81 MMHG

## 2025-02-19 DIAGNOSIS — I87.1 COMPRESSION OF VEIN: ICD-10-CM

## 2025-02-19 DIAGNOSIS — M54.2 NECK PAIN: ICD-10-CM

## 2025-02-19 DIAGNOSIS — G54.0 TOS (THORACIC OUTLET SYNDROME): ICD-10-CM

## 2025-02-19 DIAGNOSIS — G24.3 CERVICAL DYSTONIA: Primary | ICD-10-CM

## 2025-02-19 DIAGNOSIS — R42 VERTIGO: ICD-10-CM

## 2025-02-19 DIAGNOSIS — I87.1 MAY-THURNER SYNDROME: ICD-10-CM

## 2025-02-19 DIAGNOSIS — I87.8 VENOUS CONGESTION: ICD-10-CM

## 2025-02-19 DIAGNOSIS — R51.9 PRESSURE IN HEAD: ICD-10-CM

## 2025-02-19 DIAGNOSIS — I87.1 NUTCRACKER PHENOMENON OF RENAL VEIN: ICD-10-CM

## 2025-02-19 RX ORDER — MULTIVITAMIN WITH IRON
1 TABLET ORAL DAILY
COMMUNITY

## 2025-02-19 ASSESSMENT — PAIN SCALES - GENERAL: PAINLEVEL_OUTOF10: NO PAIN (0)

## 2025-02-19 NOTE — LETTER
"2/19/2025       RE: Eloisa Rene  [address]  [place] [address]     Dear Colleague,    Thank you for referring your patient, Eloisa Rene, to the [facility] [place] at [facility]. Please see a copy of my visit note below.    [facility]    Neurology Consult    2/19/2025      Eloisa Rene MRN# [id number]   YOB: 2005 Age: 19 year old      Primary care provider:   Vania Blancojacki Powell    Requesting provider:   Osei Robles    Reason for Consult:  Jugular vein stenosis    IMPRESSIONS:  Eloisa Rene is a 19 year old female with POTS, TOS, small fiber neuropathy, Cushing's syndrome (high morning cortisol), painful and heavy menstrual cycles since 2018, rocking vertigo since 2020, exercise induced presyncope with frequent urination since 2021, took a big turn for the worse April 2022 with an unclear trigger. Only notable events was taking ciprofloxacin 2 weeks prior. Symptoms included a total body but upper body predominant vibrational sense, high pitched and pulsatile tinnitus, head and ear pressure, presyncope, palpitations, tachycardia, dilated pupils, neck weakness, head heaviness, generalized weakness, cold intolerance, shortness of breath, dysphagia, brain fog, and chronic fatigue.. Headaches seem to originate from her neck and shoulders and are exacerbated with arm movement.     She presented to Dr. Osei Robles on 1-17-25 with these symptoms. 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 A 2-4-25 venogram with Dr. Robles showed: \"Mild extrinsic compression of the right jugular vein at the level of C5-C6 at rest, widely patent left internal jugular vein at rest. With ipsilateral neck rotation and flexion there was complete occlusion of the jugular vein with extensive central reflux.\" She has been receiving physical therapy with minimal improvement.     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 She has some hypermobility as well. She has bilaterally positive upper limb tension test with head rotation that triggers tinnitus, chest pain, and rocking vertigo.  She has diffuse tenderness in the neck and thoracic area.      We discussed the multifocal nature of venous congestion disorders that can affect both the internal and subclavian veins.  A lot of the symptoms that patients get are from the shunting of blood that can go towards the vertebral veins into the spinal cord or back up towards the skull base.  She does have evidence of jugular vein compression based on her venogram.  She has positive findings for thoracic outlet syndrome as well as a history that is suggestive for pelvic venous congestion that can occur in young women with hypermobility.  The evaluation for all of these considerations can be quite extensive.  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  This could have prognostic implications for her undergoing an SCM partial myotomy and resection of the omohyoid muscle.  Typically patients who have concurrent internal jugular vein compression of atlas do not do as well with muscle surgery although they can still have some benefit.  We can also do a trial anesthetic block to the sternocleidomastoid and anterior scalene muscle to determine the role of muscular compressions and her symptoms..  Because of her age and hypermobility I think she would be a better candidate for surgery than for repeated injections of neurotoxin in the long run.  However, she does have features of cervical dystonia and pectoralis minor dystonia for which neurotoxin injections may be helpful at least in the short-term.    Recommendations:  US TOS/IJV  including IJV  CTV head and neck with head turning  CTV head and neck neutral and head flexion  CTV abdomen pelvis for pelvic venous congestion syndrome  Schedule for trial SCM/ASM lidocaine block  If helpful, then direct to surgery  If not helpful, then proceed to Xeomin injections for a longer observation period  Consider using a carbonic anhydrase inhibitor to lower intracranial pressure after imaging    HISTORY OF PRESENT ILLNESS:  DIZZINESS: 2020 during the morning, had rocking vertigo, saw ENT/PT. Unclear reason. Persistent since that day. Lying on back it gets worse. Can feel it standing and sitting, same intensity. It can be worse when she walk. Driving in a car is better if she is driving. Worse when she is the passenger. She doesn't tolerate the eye movements of driving. All head positions are triggering. Initially, looking side to side was initially the worse, looking right and left are the same. Was in school until Fall 2024 but had to stop because eye movement trigger the swaying.   Spinning vertigo: Never  Rocking vertigo: Predominant  Triggers: Head movement, eye movement, elevators, flying, premenstrually, cold and hot weather, sun exposure, and wind, not barometric cycle. Not affected by food.   Last VNG: Never    AURAL SYMPTOMS:  Tinnitus: Around 2020 but worsened in April 2022, episodic, same, high pitched, some whooshing heart beat. Most noticeable after driving. Not with raising arms. Looking down can trigger.   Ear pressure: Goes with the head pressure. Bilateral, equal. Affected by the weather.  Hearing loss: Occasional with ear fullness, goes out for a few seconds.   Last audiogram: Not since childhood.    HEADACHE:  April 2022. Had been at work, standing for about 3 hours, legs were tired that night, went to sleep. Woke up the next morning, with tremors all over like she was shivering. Every morning, she more tremoring. Otherwise, they are mostly in the upper extremities. This was followed  by palpitations, tachycardia, pupils were dilated, presyncope, neck felt weak, with head heaviness.   No prior history of headaches or migraine. Head pressure started then and has worsened with time. The head feels like it will explode. There is eye pressure, forehead, and facial pressure. Lying down makes it worse. Sleeps on the side. Can't sleep propped up. Coughing, straining, bending over makes it worse. Vision has been harder to focus. No loss vision in one eye. Vision can go dark with standing up. Has nausea with the head pressure. Mild light and sound sensitivity.   No lumbar puncture.   Aura: None  Last brain imaging:10-28-22 MRI Sella protocol  IMPRESSION:   1. Normal MRI of the brain.   2. Normal appearance of the sellar and parasellar regions, including a normal appearance of the pituitary gland.     NECK/UPPER EXT SYMPTOMS:  Neck pain: Started in the last couple years, didn't start right away. Worsened over the last 18 months. Mostly base of skull. Not head position dependent. It is worse lying down.   Shoulder pain: Occasional  Arm pain: Yes, bilateral, forearm  Hand pain: No  Hand weakness: Weak, right handed  Numbness/tingling hands: Yes, symmetric  Color change hands: Really purple with heat  Swelling hands: Not generally  There is a sense of internal vibration in the head, neck, and down the arms.     Last EM2023 [facility]. Right arm and leg. Normal    BULBAR SYMPTOMS:  Dysphagia: Yes, Has to put the head forward.  No aspiration. Notices that the right side of the neck bulges with swallowing.   Throat pressure: Yes, feels fullness on the right side.   Chronic cough: No    CARDIAC:  Chest pain: No  Shortness of breath: Yes, with exertion, heat, sitting up. Sometimes lying down.   Palpitation: Yes, episodic. Just a few seconds.  Syncope: Never, but close.   Last Echo: 22  Final Impressions:  1. Normal echocardiogram.  2. Normal left ventricular size, normal wall thickness, normal  global systolic function, calculated EF of 61 %.  3. Right ventricular cavity size is normal, global systolic RV function is normal.  4. No significant valve disease detected.     COGNITIVE:  Memory: Has had brain fog, fatigue, slow thinking, not severe. Had to stop school this year. Would be a adelia credit wise   Attention: None new. Previous ADHD, took stimulants   Processing speed: Slow response    OTHER:  Pelvic pressure: Yes  Rectal pressure: No  Hematuria: No  Swelling in feet: No  Started with painful menses at age 12, around 2018, went on OCP, didn't help with the pain but had bloating, difficulty with mood.   They were also heavy, helped with ibuprofen, cycle length is 30-40 with 3-4 days of bleeding. No other hormones tried.   Was very healthy and very active (volley ball, soccer, basketball) before she became ill.    ACTIVITY:  Concussions: None  MVA: None  Neck wires: None  Exercise: Can't do any now.   Sleep Quality: 12:30pm-1am. Wakes up in the middle of the night to urinate. Feels very weak all over and tremulous, has a sense of bobbing feeling, and her upper spine feels unstable. The feeling of the swaying is not pulsatile.   Diet: had to cut out sugar, carbs, dairy, soy, because they make the internal tremulousness worse. No pain with eating.     2021 general sense of weakness, presyncope, triggered by exercise. There was an overall sense of fatigue.   Frequent urination 2021 senior year, pelvic pressure, now is worse before menses. This has persisted. She had been treated with ciprofloxacin about 2 weeks before April 2022 event.     PAST MEDICAL HISTORY:  No past medical history on file.     PAST SURGICAL HISTORY:  Past Surgical History:   Procedure Laterality Date     IR CAROTID CEREBRAL ANGIOGRAM BILATERAL  2/15/2023     SOCIAL HISTORY: Single, no smoking, no alcohol      ALLERGIES:  Allergies   Allergen Reactions     Amphetamine-Dextroamphet Er Other (See Comments)     She did not feel well  while taking      Desogestrel-Ethinyl Estradiol Unknown     Drospirenone-Ethinyl Estradiol Other (See Comments)     Suicidal thoughts      Fluoxetine Other (See Comments)     Suicidal thoughts      MEDICATIONS:    Current Outpatient Medications:      ibuprofen (ADVIL/MOTRIN) 200 MG tablet, Take 600 mg by mouth every 6 hours as needed for pain, Disp: , Rfl:      PHYSICAL EXAM:  Vitals:  /81   Pulse (!) 125   Resp 16   SpO2 99%     General: Patient is well-nourished, well-groomed, in no apparent distress. Here with father.     HEENT: Head is atraumatic, eyes look normal exteriorly, throat clear, neck supple. Neck is very pitched forward. Shoulders are very rolled forward. Right shoulder lower.   Ext: Warm, well-perfused. No edema. Good pulses. Fingers are very long.     Neurologic:  Mental Status: Alert and oriented to person, place, time, and situation.  Able to provide an excellent history.     Cranial Nerves: Visual fields full to confrontation. Pupils equal and reactive to light.  Extraocular movements full.  Face sensation normal.  Normal head impulse testing.  Normal hearing to finger rub. Face symmetric with normal movements. Tongue and palate midline.  Normal shoulder elevation.      Motor: Normal bulk and tone.  No pronator drift.  Normal foot taps.  Full strength to confrontational testing.    Sensory: Normal light touch, temperature, and vibration.    Reflexes: Biceps, Brachioradialis, Triceps, Knees, Ankles 2/4.     Coordination: Normal finger to nose, rapid alternating movements    Gait: Normal stance width.  Negative Romberg.  Good gait initiation.  Good stride length.  Good arm swing.  Normal turn. Able to walk 5 steps in tandem.      Beighton Score (1 point for each joint)   Fifth finger extension > 90 degrees 1   Thumb touches the forearm on wrist flexion 0   Elbow extension >180 degrees 2   Knee extension >180 degrees 0   Places hands flat on the floor with knees extended 0      TOTAL    3      Upper Limb Tension Test for Thoracic Outlet Syndrome:  Arms abducted: Numbness and tingling develop in none    Arms abducted head turned to the RIGHT: right ear tinnitus, right chest pain,    Right hand gets worse, the right side is worse   Left hand gets worse  Arms abducted head turned to the LEFT: left throat fullness, left chest pain   Left hand gets worse   Right hand gets worse, right worse than left    Arms abducted head tilt to the RIGHT:   Right hand gets none   Left hand gets none  Arms abducted head tilt to the LEFT: Worsening rocking vertigo   Left hand gets none   Right hand gets none    Pain Right scalene: Mild tenderness, no radiation   Pain Left scalene: Mild tenderness, no radiation  Pain Right sternocleidomastoid: Mild tenderness, no radiation  Pain Left sternocleidomastoid: Mild tenderness, no radiation  Pain at C1: Mild tenderness, no radiation    Pain under the RIGHT pectoralis minor tendon: Mild tenderness, no radiation  Pain at the RIGHT 1st rib-sternum insertion site: Mild tenderness, no radiation  Pain under the LEFT pectoralis minor tendon: Mild tenderness, no radiation  Pain at the LEFT 1st rib-sternum insertion site: Mild tenderness, no radiation     DATA:  All available and relevant labs, imaging, and other procedures were reviewed personally.   Last brain imaging:  CT Chest w Contrast  Narrative: EXAM: CT CHEST W CONTRAST  LOCATION: [facility]  DATE: 12/2/2024    INDICATION: for TOS. Contrast through symptomatic arm.  Venous stenosis, cervical ribs, any chest masses focused on.  COMPARISON: None.  TECHNIQUE: CT chest with IV contrast. Multiplanar reformats were obtained. Dose reduction techniques were used.    CONTRAST: 93mL Isovue 370    FINDINGS:   LUNGS AND PLEURA: Lungs are clear. No pleural effusion.    MEDIASTINUM/AXILLAE: There is narrowing of the right subclavian vein between the clavicle and first rib with some collateral formation superior right  shoulder. No lymphadenopathy. No thoracic aneurysm.    CORONARY ARTERY CALCIFICATION: None.    UPPER ABDOMEN: No significant finding.    MUSCULOSKELETAL: No cervical rib.  Impression: IMPRESSION:   1.  There is some narrowing of the right subclavian vein medially between the medial clavicle and medial first rib with some collateral venous formation in the right superior shoulder.    ULTRASOUND UPPER EXTREMITY VEIN AND PPG THORACIC OUTLET SYNDROME  BILATERAL 7/24/2024 1:55 PM    FINDINGS:  RIGHT:       REST:            INNOMINATE VEIN: 79 cm/s, phasic            SUBCLAVIAN VEIN, medial: 69 cm/s, phasic            SUBCLAVIAN VEIN, mid: 73 cm/s, phasic, fully compressible            SUBCLAVIAN VEIN, lateral: 75 cm/s, phasic, fully  compressible            AXILLARY VEIN: 113 cm/s, phasic, fully compressible          MID SUBCLAVIAN VEIN, sitting erect:            0 degrees: 116 cm/s, phasic            90 degrees: 67 cm/s, phasic            135 degrees: 115 cm/s, phasic            180 degrees: 204 cm/s, phasic          PPGs:            Baseline: Normal            Arms 90: Normal              Arms 180: ABNORMAL - OCCLUDED            : ABNORMAL - diminished             head right: ABNORMAL - diminished             head left: ABNORMAL - diminished     LEFT:       REST:            INNOMINATE VEIN: 37 cm/s, phasic            SUBCLAVIAN VEIN, medial: 62 cm/s, phasic            SUBCLAVIAN VEIN, mid: 69 cm/s, phasic, fully compressible            SUBCLAVIAN VEIN, lateral: 139 cm/s, phasic, fully  compressible            AXILLARY VEIN: 105 cm/s, phasic, fully compressible          MID SUBCLAVIAN VEIN, sitting erect:            0 degrees: 25 cm/s, phasic            90 degrees: 95 cm/s, phasic            135 degrees: 31 cm/s, phasic            180 degrees: 13 cm/s, phasic         PPGs:            Baseline: Normal            Arms 90: Normal            Arms 180: ABNORMAL - diminished - nearly occluded             : ABNORMAL - diminished - nearly occluded             head right: Normal             head left: Normal                                                             IMPRESSION: Thoracic outlet/inlet physiology suggested. Correlate for  symptoms.  1. RIGHT:       A. No subclavian venous stenosis suggested at rest.       B. No subclavian venous stenosis suggested with maneuvers.       C. PPG occludes in Arms 180. Diminished in  positions.     2. LEFT:       A. No subclavian venous stenosis suggested at rest.       B. No subclavian venous stenosis suggested with maneuvers.       C. PPG nearly occluded in Arms 180 and .     DALIA MOSES MD       The longitudinal plan of care for the condition(s) below were addressed during this visit. Due to the added complexity in care, I will continue to support Eloisa in the subsequent management of this condition(s) and with the ongoing continuity of care of this condition(s).    150-minutes were spent in evaluation, examination, and documentation on the date of service      Again, thank you for allowing me to participate in the care of your patient.      Sincerely,    Doni RIBERA Cha, MD

## 2025-02-19 NOTE — PROGRESS NOTES
"[facility]    Neurology Consult    2/19/2025      Eloisa Rene MRN# [id number]   YOB: 2005 Age: 19 year old      Primary care provider:   Vania, Jessie Powell    Requesting provider:   Osei Robles    Reason for Consult:  Jugular vein stenosis    IMPRESSIONS:  Eloisa Rene is a 19 year old female with POTS, TOS, small fiber neuropathy, Cushing's syndrome (high morning cortisol), painful and heavy menstrual cycles since 2018, rocking vertigo since 2020, exercise induced presyncope with frequent urination since 2021, took a big turn for the worse April 2022 with an unclear trigger. Only notable events was taking ciprofloxacin 2 weeks prior. Symptoms included a total body but upper body predominant vibrational sense, high pitched and pulsatile tinnitus, head and ear pressure, presyncope, palpitations, tachycardia, dilated pupils, neck weakness, head heaviness, generalized weakness, cold intolerance, shortness of breath, dysphagia, brain fog, and chronic fatigue.. Headaches seem to originate from her neck and shoulders and are exacerbated with arm movement.     She presented to Dr. Osei Robles on 1-17-25 with these symptoms. 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 A 2-4-25 venogram with Dr. Robles showed: \"Mild extrinsic compression of the right jugular vein at the level of C5-C6 at rest, widely patent left internal jugular vein at rest. With ipsilateral neck rotation and flexion there was complete occlusion of the jugular vein with extensive central reflux.\" She has been receiving physical therapy with minimal improvement.     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 She has some hypermobility as well. She has bilaterally positive upper limb tension test with head rotation that triggers tinnitus, chest pain, and rocking " vertigo.  She has diffuse tenderness in the neck and thoracic area.      We discussed the multifocal nature of venous congestion disorders that can affect both the internal and subclavian veins.  A lot of the symptoms that patients get are from the shunting of blood that can go towards the vertebral veins into the spinal cord or back up towards the skull base.  She does have evidence of jugular vein compression based on her venogram.  She has positive findings for thoracic outlet syndrome as well as a history that is suggestive for pelvic venous congestion that can occur in young women with hypermobility.  The evaluation for all of these considerations can be quite extensive.  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  This could have prognostic implications for her undergoing an SCM partial myotomy and resection of the omohyoid muscle.  Typically patients who have concurrent internal jugular vein compression of atlas do not do as well with muscle surgery although they can still have some benefit.  We can also do a trial anesthetic block to the sternocleidomastoid and anterior scalene muscle to determine the role of muscular compressions and her symptoms..  Because of her age and hypermobility I think she would be a better candidate for surgery than for repeated injections of neurotoxin in the long run.  However, she does have features of cervical dystonia and pectoralis minor dystonia for which neurotoxin injections may be helpful at least in the short-term.    Recommendations:  US TOS/IJV including IJV  CTV head and neck with head turning  CTV head and neck neutral and head flexion  CTV abdomen pelvis for pelvic venous congestion syndrome  Schedule for trial SCM/ASM lidocaine block  If helpful, then direct to surgery  If not helpful, then proceed to Xeomin injections for a longer observation  period  Consider using a carbonic anhydrase inhibitor to lower intracranial pressure after imaging    HISTORY OF PRESENT ILLNESS:  DIZZINESS: 2020 during the morning, had rocking vertigo, saw ENT/PT. Unclear reason. Persistent since that day. Lying on back it gets worse. Can feel it standing and sitting, same intensity. It can be worse when she walk. Driving in a car is better if she is driving. Worse when she is the passenger. She doesn't tolerate the eye movements of driving. All head positions are triggering. Initially, looking side to side was initially the worse, looking right and left are the same. Was in school until Fall 2024 but had to stop because eye movement trigger the swaying.   Spinning vertigo: Never  Rocking vertigo: Predominant  Triggers: Head movement, eye movement, elevators, flying, premenstrually, cold and hot weather, sun exposure, and wind, not barometric cycle. Not affected by food.   Last VNG: Never    AURAL SYMPTOMS:  Tinnitus: Around 2020 but worsened in April 2022, episodic, same, high pitched, some whooshing heart beat. Most noticeable after driving. Not with raising arms. Looking down can trigger.   Ear pressure: Goes with the head pressure. Bilateral, equal. Affected by the weather.  Hearing loss: Occasional with ear fullness, goes out for a few seconds.   Last audiogram: Not since childhood.    HEADACHE:  April 2022. Had been at work, standing for about 3 hours, legs were tired that night, went to sleep. Woke up the next morning, with tremors all over like she was shivering. Every morning, she more tremoring. Otherwise, they are mostly in the upper extremities. This was followed by palpitations, tachycardia, pupils were dilated, presyncope, neck felt weak, with head heaviness.   No prior history of headaches or migraine. Head pressure started then and has worsened with time. The head feels like it will explode. There is eye pressure, forehead, and facial pressure. Lying down makes it  worse. Sleeps on the side. Can't sleep propped up. Coughing, straining, bending over makes it worse. Vision has been harder to focus. No loss vision in one eye. Vision can go dark with standing up. Has nausea with the head pressure. Mild light and sound sensitivity.   No lumbar puncture.   Aura: None  Last brain imaging:10-28-22 MRI Sella protocol  IMPRESSION:   1. Normal MRI of the brain.   2. Normal appearance of the sellar and parasellar regions, including a normal appearance of the pituitary gland.     NECK/UPPER EXT SYMPTOMS:  Neck pain: Started in the last couple years, didn't start right away. Worsened over the last 18 months. Mostly base of skull. Not head position dependent. It is worse lying down.   Shoulder pain: Occasional  Arm pain: Yes, bilateral, forearm  Hand pain: No  Hand weakness: Weak, right handed  Numbness/tingling hands: Yes, symmetric  Color change hands: Really purple with heat  Swelling hands: Not generally  There is a sense of internal vibration in the head, neck, and down the arms.     Last EM2023 [facility]. Right arm and leg. Normal    BULBAR SYMPTOMS:  Dysphagia: Yes, Has to put the head forward.  No aspiration. Notices that the right side of the neck bulges with swallowing.   Throat pressure: Yes, feels fullness on the right side.   Chronic cough: No    CARDIAC:  Chest pain: No  Shortness of breath: Yes, with exertion, heat, sitting up. Sometimes lying down.   Palpitation: Yes, episodic. Just a few seconds.  Syncope: Never, but close.   Last Echo: 22  Final Impressions:  1. Normal echocardiogram.  2. Normal left ventricular size, normal wall thickness, normal global systolic function, calculated EF of 61 %.  3. Right ventricular cavity size is normal, global systolic RV function is normal.  4. No significant valve disease detected.     COGNITIVE:  Memory: Has had brain fog, fatigue, slow thinking, not severe. Had to stop school this year. Would be a adelia credit wise    Attention: None new. Previous ADHD, took stimulants   Processing speed: Slow response    OTHER:  Pelvic pressure: Yes  Rectal pressure: No  Hematuria: No  Swelling in feet: No  Started with painful menses at age 12, around 2018, went on OCP, didn't help with the pain but had bloating, difficulty with mood.   They were also heavy, helped with ibuprofen, cycle length is 30-40 with 3-4 days of bleeding. No other hormones tried.   Was very healthy and very active (volley ball, soccer, basketball) before she became ill.    ACTIVITY:  Concussions: None  MVA: None  Neck wires: None  Exercise: Can't do any now.   Sleep Quality: 12:30pm-1am. Wakes up in the middle of the night to urinate. Feels very weak all over and tremulous, has a sense of bobbing feeling, and her upper spine feels unstable. The feeling of the swaying is not pulsatile.   Diet: had to cut out sugar, carbs, dairy, soy, because they make the internal tremulousness worse. No pain with eating.     2021 general sense of weakness, presyncope, triggered by exercise. There was an overall sense of fatigue.   Frequent urination 2021 senior year, pelvic pressure, now is worse before menses. This has persisted. She had been treated with ciprofloxacin about 2 weeks before April 2022 event.     PAST MEDICAL HISTORY:  No past medical history on file.     PAST SURGICAL HISTORY:  Past Surgical History:   Procedure Laterality Date    IR CAROTID CEREBRAL ANGIOGRAM BILATERAL  2/15/2023     SOCIAL HISTORY: Single, no smoking, no alcohol      ALLERGIES:  Allergies   Allergen Reactions    Amphetamine-Dextroamphet Er Other (See Comments)     She did not feel well while taking     Desogestrel-Ethinyl Estradiol Unknown    Drospirenone-Ethinyl Estradiol Other (See Comments)     Suicidal thoughts     Fluoxetine Other (See Comments)     Suicidal thoughts      MEDICATIONS:    Current Outpatient Medications:     ibuprofen (ADVIL/MOTRIN) 200 MG tablet, Take 600 mg by mouth every 6  hours as needed for pain, Disp: , Rfl:      PHYSICAL EXAM:  Vitals:  /81   Pulse (!) 125   Resp 16   SpO2 99%     General: Patient is well-nourished, well-groomed, in no apparent distress. Here with father.     HEENT: Head is atraumatic, eyes look normal exteriorly, throat clear, neck supple. Neck is very pitched forward. Shoulders are very rolled forward. Right shoulder lower.   Ext: Warm, well-perfused. No edema. Good pulses. Fingers are very long.     Neurologic:  Mental Status: Alert and oriented to person, place, time, and situation.  Able to provide an excellent history.     Cranial Nerves: Visual fields full to confrontation. Pupils equal and reactive to light.  Extraocular movements full.  Face sensation normal.  Normal head impulse testing.  Normal hearing to finger rub. Face symmetric with normal movements. Tongue and palate midline.  Normal shoulder elevation.      Motor: Normal bulk and tone.  No pronator drift.  Normal foot taps.  Full strength to confrontational testing.    Sensory: Normal light touch, temperature, and vibration.    Reflexes: Biceps, Brachioradialis, Triceps, Knees, Ankles 2/4.     Coordination: Normal finger to nose, rapid alternating movements    Gait: Normal stance width.  Negative Romberg.  Good gait initiation.  Good stride length.  Good arm swing.  Normal turn. Able to walk 5 steps in tandem.      Beighton Score (1 point for each joint)   Fifth finger extension > 90 degrees 1   Thumb touches the forearm on wrist flexion 0   Elbow extension >180 degrees 2   Knee extension >180 degrees 0   Places hands flat on the floor with knees extended 0      TOTAL    3     Upper Limb Tension Test for Thoracic Outlet Syndrome:  Arms abducted: Numbness and tingling develop in none    Arms abducted head turned to the RIGHT: right ear tinnitus, right chest pain,    Right hand gets worse, the right side is worse   Left hand gets worse  Arms abducted head turned to the LEFT: left throat  fullness, left chest pain   Left hand gets worse   Right hand gets worse, right worse than left    Arms abducted head tilt to the RIGHT:   Right hand gets none   Left hand gets none  Arms abducted head tilt to the LEFT: Worsening rocking vertigo   Left hand gets none   Right hand gets none    Pain Right scalene: Mild tenderness, no radiation   Pain Left scalene: Mild tenderness, no radiation  Pain Right sternocleidomastoid: Mild tenderness, no radiation  Pain Left sternocleidomastoid: Mild tenderness, no radiation  Pain at C1: Mild tenderness, no radiation    Pain under the RIGHT pectoralis minor tendon: Mild tenderness, no radiation  Pain at the RIGHT 1st rib-sternum insertion site: Mild tenderness, no radiation  Pain under the LEFT pectoralis minor tendon: Mild tenderness, no radiation  Pain at the LEFT 1st rib-sternum insertion site: Mild tenderness, no radiation     DATA:  All available and relevant labs, imaging, and other procedures were reviewed personally.   Last brain imaging:  CT Chest w Contrast  Narrative: EXAM: CT CHEST W CONTRAST  LOCATION: [facility]  DATE: 12/2/2024    INDICATION: for TOS. Contrast through symptomatic arm.  Venous stenosis, cervical ribs, any chest masses focused on.  COMPARISON: None.  TECHNIQUE: CT chest with IV contrast. Multiplanar reformats were obtained. Dose reduction techniques were used.    CONTRAST: 93mL Isovue 370    FINDINGS:   LUNGS AND PLEURA: Lungs are clear. No pleural effusion.    MEDIASTINUM/AXILLAE: There is narrowing of the right subclavian vein between the clavicle and first rib with some collateral formation superior right shoulder. No lymphadenopathy. No thoracic aneurysm.    CORONARY ARTERY CALCIFICATION: None.    UPPER ABDOMEN: No significant finding.    MUSCULOSKELETAL: No cervical rib.  Impression: IMPRESSION:   1.  There is some narrowing of the right subclavian vein medially between the medial clavicle and medial first rib with  some collateral venous formation in the right superior shoulder.    ULTRASOUND UPPER EXTREMITY VEIN AND PPG THORACIC OUTLET SYNDROME  BILATERAL 7/24/2024 1:55 PM    FINDINGS:  RIGHT:       REST:            INNOMINATE VEIN: 79 cm/s, phasic            SUBCLAVIAN VEIN, medial: 69 cm/s, phasic            SUBCLAVIAN VEIN, mid: 73 cm/s, phasic, fully compressible            SUBCLAVIAN VEIN, lateral: 75 cm/s, phasic, fully  compressible            AXILLARY VEIN: 113 cm/s, phasic, fully compressible          MID SUBCLAVIAN VEIN, sitting erect:            0 degrees: 116 cm/s, phasic            90 degrees: 67 cm/s, phasic            135 degrees: 115 cm/s, phasic            180 degrees: 204 cm/s, phasic          PPGs:            Baseline: Normal            Arms 90: Normal              Arms 180: ABNORMAL - OCCLUDED            : ABNORMAL - diminished             head right: ABNORMAL - diminished             head left: ABNORMAL - diminished     LEFT:       REST:            INNOMINATE VEIN: 37 cm/s, phasic            SUBCLAVIAN VEIN, medial: 62 cm/s, phasic            SUBCLAVIAN VEIN, mid: 69 cm/s, phasic, fully compressible            SUBCLAVIAN VEIN, lateral: 139 cm/s, phasic, fully  compressible            AXILLARY VEIN: 105 cm/s, phasic, fully compressible          MID SUBCLAVIAN VEIN, sitting erect:            0 degrees: 25 cm/s, phasic            90 degrees: 95 cm/s, phasic            135 degrees: 31 cm/s, phasic            180 degrees: 13 cm/s, phasic         PPGs:            Baseline: Normal            Arms 90: Normal            Arms 180: ABNORMAL - diminished - nearly occluded            : ABNORMAL - diminished - nearly occluded             head right: Normal             head left: Normal                                                             IMPRESSION: Thoracic outlet/inlet physiology suggested. Correlate for  symptoms.  1. RIGHT:       A. No subclavian venous  stenosis suggested at rest.       B. No subclavian venous stenosis suggested with maneuvers.       C. PPG occludes in Arms 180. Diminished in  positions.     2. LEFT:       A. No subclavian venous stenosis suggested at rest.       B. No subclavian venous stenosis suggested with maneuvers.       C. PPG nearly occluded in Arms 180 and .     DALIA MOSES MD       The longitudinal plan of care for the condition(s) below were addressed during this visit. Due to the added complexity in care, I will continue to support Eloisa in the subsequent management of this condition(s) and with the ongoing continuity of care of this condition(s).    150-minutes were spent in evaluation, examination, and documentation on the date of service

## 2025-02-20 ENCOUNTER — TELEPHONE (OUTPATIENT)
Dept: NEUROLOGY | Facility: CLINIC | Age: 20
End: 2025-02-20
Payer: COMMERCIAL

## 2025-03-02 ENCOUNTER — HEALTH MAINTENANCE LETTER (OUTPATIENT)
Age: 20
End: 2025-03-02

## 2025-03-12 ENCOUNTER — ANCILLARY PROCEDURE (OUTPATIENT)
Dept: ULTRASOUND IMAGING | Facility: CLINIC | Age: 20
End: 2025-03-12
Attending: PSYCHIATRY & NEUROLOGY
Payer: COMMERCIAL

## 2025-03-12 ENCOUNTER — ANCILLARY PROCEDURE (OUTPATIENT)
Dept: CT IMAGING | Facility: CLINIC | Age: 20
End: 2025-03-12
Attending: PSYCHIATRY & NEUROLOGY
Payer: COMMERCIAL

## 2025-03-12 DIAGNOSIS — G54.0 TOS (THORACIC OUTLET SYNDROME): ICD-10-CM

## 2025-03-12 DIAGNOSIS — I87.1 COMPRESSION OF VEIN: ICD-10-CM

## 2025-03-12 PROCEDURE — 93922 UPR/L XTREMITY ART 2 LEVELS: CPT | Performed by: RADIOLOGY

## 2025-03-12 PROCEDURE — 70496 CT ANGIOGRAPHY HEAD: CPT | Performed by: RADIOLOGY

## 2025-03-12 PROCEDURE — 70498 CT ANGIOGRAPHY NECK: CPT | Performed by: RADIOLOGY

## 2025-03-12 PROCEDURE — 93970 EXTREMITY STUDY: CPT | Performed by: RADIOLOGY

## 2025-03-12 RX ORDER — IOPAMIDOL 755 MG/ML
70 INJECTION, SOLUTION INTRAVASCULAR ONCE
Status: COMPLETED | OUTPATIENT
Start: 2025-03-12 | End: 2025-03-12

## 2025-03-12 RX ADMIN — IOPAMIDOL 70 ML: 755 INJECTION, SOLUTION INTRAVASCULAR at 15:07

## 2025-03-12 NOTE — DISCHARGE INSTRUCTIONS

## 2025-03-18 ENCOUNTER — TELEPHONE (OUTPATIENT)
Dept: NEUROLOGY | Facility: CLINIC | Age: 20
End: 2025-03-18
Payer: COMMERCIAL

## 2025-03-18 NOTE — TELEPHONE ENCOUNTER
[facility] Call Center    Phone Message    May a detailed message be left on voicemail: yes     Reason for Call: Other: Patient returning call for Aster Thornton for scheduling 08/19/2025. Patient can be reached at [phone]     Action Taken: Message routed to:  [facility] neurology    Travel Screening: Not Applicable

## 2025-03-18 NOTE — TELEPHONE ENCOUNTER
Left Voicemail (1st Attempt) for the patient to call back and schedule the following:    Appointment type: RETURN NEUROLOGY  Provider: NAOMI  Return date: 08/19/2025  Specialty phone number: [phone]  Additional appointment(s) needed: N/A  Additonal Notes: N/A

## 2025-03-20 ENCOUNTER — TELEPHONE (OUTPATIENT)
Dept: NEUROLOGY | Facility: CLINIC | Age: 20
End: 2025-03-20
Payer: COMMERCIAL

## 2025-03-20 NOTE — TELEPHONE ENCOUNTER
Left Voicemail (2nd Attempt) for the patient to call back and schedule the following:    Appointment type: RETURN NEUROLOGY  Provider: NAOMI  Return date: 8/19/2025  Specialty phone number: [phone]  Additional appointment(s) needed: N/A  Additonal Notes: N/A

## 2025-03-20 NOTE — TELEPHONE ENCOUNTER
[facility] Call Center    Phone Message    May a detailed message be left on voicemail: yes     Reason for Call: patient is calling back to schedule a work in with , please call back anytime today. If calling in the morning please call after 11 AM    Action Taken: Message routed to:  [facility] neurology    Travel Screening: Not Applicable     Date of Service:

## 2025-03-20 NOTE — TELEPHONE ENCOUNTER
Patient confirmed scheduled appointment:  Date: 10/15/25  Time: 4pm  Visit type: Return Neurology  Provider: Myrtle  Location: [facility]  Testing/imaging: NA  Additional notes: 6 month follow up    Chasity Leary on 3/20/2025 at 11:03 AM

## 2025-04-04 ENCOUNTER — ANCILLARY PROCEDURE (OUTPATIENT)
Dept: CT IMAGING | Facility: CLINIC | Age: 20
End: 2025-04-04
Attending: PSYCHIATRY & NEUROLOGY
Payer: COMMERCIAL

## 2025-04-04 DIAGNOSIS — M54.2 NECK PAIN: ICD-10-CM

## 2025-04-04 DIAGNOSIS — R42 VERTIGO: ICD-10-CM

## 2025-04-04 DIAGNOSIS — I87.1 COMPRESSION OF VEIN: ICD-10-CM

## 2025-04-04 PROCEDURE — 70498 CT ANGIOGRAPHY NECK: CPT | Mod: GC | Performed by: RADIOLOGY

## 2025-04-04 PROCEDURE — 70496 CT ANGIOGRAPHY HEAD: CPT | Mod: GC | Performed by: RADIOLOGY

## 2025-04-04 RX ORDER — IOPAMIDOL 755 MG/ML
70 INJECTION, SOLUTION INTRAVASCULAR ONCE
Status: COMPLETED | OUTPATIENT
Start: 2025-04-04 | End: 2025-04-04

## 2025-04-04 RX ADMIN — IOPAMIDOL 70 ML: 755 INJECTION, SOLUTION INTRAVASCULAR at 16:50

## 2025-04-04 NOTE — DISCHARGE INSTRUCTIONS

## 2025-04-08 DIAGNOSIS — M53.2X2 CERVICAL SPINE INSTABILITY: ICD-10-CM

## 2025-04-08 DIAGNOSIS — M35.7 MUSCULOSKELETAL HYPERMOBILITY: Primary | ICD-10-CM

## 2025-04-09 ENCOUNTER — TELEPHONE (OUTPATIENT)
Dept: NEUROLOGY | Facility: CLINIC | Age: 20
End: 2025-04-09
Payer: COMMERCIAL

## 2025-04-09 NOTE — TELEPHONE ENCOUNTER
[facility] Call Center    Phone Message    May a detailed message be left on voicemail: yes     Reason for Call: Order(s): Other:   Reason for requested: Pt is requesting that her MRI Orders be sent to [facility]. Pt provided a fax # [phone]. Pt is requesting a call back from a nurse once orders have been faxed.  Date needed: 4/9/25  Provider name: Dr. Iraheta     Action Taken: Message routed to:  [facility] Neurology    Travel Screening: Not Applicable

## 2025-04-09 NOTE — TELEPHONE ENCOUNTER
I called and spoke to patient Your Imaging orders have been faxed to [facility]. Patient understood

## 2025-04-18 ENCOUNTER — ANCILLARY PROCEDURE (OUTPATIENT)
Dept: CT IMAGING | Facility: CLINIC | Age: 20
End: 2025-04-18
Attending: PSYCHIATRY & NEUROLOGY
Payer: COMMERCIAL

## 2025-04-18 DIAGNOSIS — I87.1 NUTCRACKER PHENOMENON OF RENAL VEIN: ICD-10-CM

## 2025-04-18 DIAGNOSIS — I87.1 COMPRESSION OF VEIN: ICD-10-CM

## 2025-04-18 DIAGNOSIS — I87.1 MAY-THURNER SYNDROME: ICD-10-CM

## 2025-04-18 PROCEDURE — 74174 CTA ABD&PLVS W/CONTRAST: CPT | Mod: GC | Performed by: STUDENT IN AN ORGANIZED HEALTH CARE EDUCATION/TRAINING PROGRAM

## 2025-04-18 RX ORDER — IOPAMIDOL 755 MG/ML
95 INJECTION, SOLUTION INTRAVASCULAR ONCE
Status: COMPLETED | OUTPATIENT
Start: 2025-04-18 | End: 2025-04-18

## 2025-04-18 RX ADMIN — IOPAMIDOL 95 ML: 755 INJECTION, SOLUTION INTRAVASCULAR at 15:12

## 2025-04-18 NOTE — DISCHARGE INSTRUCTIONS

## 2025-05-08 DIAGNOSIS — R51.9 PRESSURE IN HEAD: ICD-10-CM

## 2025-05-08 DIAGNOSIS — M24.20 EAGLE'S SYNDROME: ICD-10-CM

## 2025-05-08 DIAGNOSIS — I87.1 MAY-THURNER SYNDROME: ICD-10-CM

## 2025-05-08 DIAGNOSIS — G24.3 CERVICAL DYSTONIA: Primary | ICD-10-CM

## 2025-05-08 DIAGNOSIS — I87.1 NUTCRACKER PHENOMENON OF RENAL VEIN: ICD-10-CM

## 2025-05-08 DIAGNOSIS — I87.1 COMPRESSION OF VEIN: ICD-10-CM

## 2025-06-11 ENCOUNTER — OFFICE VISIT (OUTPATIENT)
Dept: DERMATOLOGY | Facility: CLINIC | Age: 20
End: 2025-06-11
Attending: STUDENT IN AN ORGANIZED HEALTH CARE EDUCATION/TRAINING PROGRAM
Payer: COMMERCIAL

## 2025-06-11 VITALS
WEIGHT: 200.62 LBS | HEART RATE: 102 BPM | SYSTOLIC BLOOD PRESSURE: 122 MMHG | BODY MASS INDEX: 28.72 KG/M2 | DIASTOLIC BLOOD PRESSURE: 81 MMHG | HEIGHT: 70 IN

## 2025-06-11 DIAGNOSIS — R10.2 PELVIC PAIN: Primary | ICD-10-CM

## 2025-06-11 DIAGNOSIS — R10.9 ABDOMINAL PAIN: ICD-10-CM

## 2025-06-11 PROCEDURE — 99204 OFFICE O/P NEW MOD 45 MIN: CPT | Performed by: STUDENT IN AN ORGANIZED HEALTH CARE EDUCATION/TRAINING PROGRAM

## 2025-06-11 PROCEDURE — 3074F SYST BP LT 130 MM HG: CPT | Performed by: STUDENT IN AN ORGANIZED HEALTH CARE EDUCATION/TRAINING PROGRAM

## 2025-06-11 PROCEDURE — 3079F DIAST BP 80-89 MM HG: CPT | Performed by: STUDENT IN AN ORGANIZED HEALTH CARE EDUCATION/TRAINING PROGRAM

## 2025-06-11 PROCEDURE — 99214 OFFICE O/P EST MOD 30 MIN: CPT | Performed by: STUDENT IN AN ORGANIZED HEALTH CARE EDUCATION/TRAINING PROGRAM

## 2025-06-11 PROCEDURE — 1125F AMNT PAIN NOTED PAIN PRSNT: CPT | Performed by: STUDENT IN AN ORGANIZED HEALTH CARE EDUCATION/TRAINING PROGRAM

## 2025-06-11 ASSESSMENT — PAIN SCALES - GENERAL: PAINLEVEL_OUTOF10: MILD PAIN (2)

## 2025-06-11 NOTE — PROGRESS NOTES
[facility]  Interventional Radiology Clinic  [address]   3rd [place]  [address]    Encounter Date: 6/11/2025    Patient: Eloisa Rene     MRN: [id number]  YOB: 2005      Age: 20 year old  Sex: Female       Referring Provider: Dr. Doni BARRAZA Cha (Neurology)    Chief Complaint    Pelvic Pain    History of Present Illness    Eloisa Rene is a 20 year old old female presenting with pelvic pain since 2020. Her medical history is notable for POTS, small fiber neuropathy, Cushing syndrome, thoracic outlet syndrome, internal jugular vein compressions, and hypermobility. The pain has been present since 2020 and is described as pelvic heaviness, constant dull aching sensation with sharp or cramping exacerbations, tugging, and vaginal pressure. The location of the pain is across lower abdomen and is intermittent, typically bilateral. The frequency of pain flares is weekly and the typical duration is one day. The pain is worsened with prolonged standing or at the end of the day and improved with lying supine. The pain is worsened with sitting at a desk or driving. The pain is worsened with eating, defecation, menstruation and acidic foods. She endorses urinary frequency, as well as bloating. She does not have a history of depression or anxiety.    Her last menstrual period was June 1. Her menses are irregular and typically normal flow, but variable. She has seen gynecology in the past which resulted in a trial of multiple OCPs without benefit. She endorses some vaginal pain with standing/activity and some left lateral leg pain. She does not endorse lower extremity swelling. She has not worn compression garments.    Nantes Criteria    [x] Pain in the territory of the pudendal nerve (e.g., vaginal, labial, perianal, perineal regions)  [] Pain worsened by sitting  [x] Not woken up at night by pain  [x] No objective sensory loss  [] A positive response to a pudendal nerve block    -----  ***  Period @ 13 y/o  Painful @ 13 y/o    2020  - urinary frequency  - bladder discomfort/pressure  - pelvic heaviness  - acid drinks make pain worse  - vertigo    2022 (April)  - POTS sx  - Heaviness holding head up  - Has    Had Margaret internal jugular venogram  Had petrosal sinus sampling with [place]    Has tried gluten, sugar, soy restriction without impact    No rx have helped; doesn't tolerate most rx very well    Has had cardiac work-up with tilt table test. U/s of UE showed ToS.    No relief from injection in SCMs.      *** -----        Patient Medical History    POTS  Small fiber neuropathy  Cushing syndrome  Thoracic outlet syndrome  Internal jugular vein compressions (bilateral)  Hypermobility    Past Surgical History:   Procedure Laterality Date    IR CAROTID CEREBRAL ANGIOGRAM BILATERAL  2/15/2023     Family and Social History    Family History   Problem Relation Age of Onset    Hypertension Mother     Hypertension Father     Diabetes Father     Congenital Anomalies Brother         Down's Syndrome     Mother and maternal grandfather with lower extremity varicose veins    Social History     Socioeconomic History    Marital status: Single   Tobacco Use    Smoking status: Never    Smokeless tobacco: Never     Allergies    Allergies   Allergen Reactions    Amphetamine-Dextroamphet Er Other (See Comments)     She did not feel well while taking     Desogestrel-Ethinyl Estradiol Unknown    Drospirenone-Ethinyl Estradiol Other (See Comments)     Suicidal thoughts     Fluoxetine Other (See Comments)     Suicidal thoughts      Medications    Current Outpatient Medications   Medication Sig Dispense Refill    furosemide (LASIX) 20 MG tablet Please take 20 mg lasix by mouth every 6 hours for 3 days if experiencing post Cerebral venogram headache, may refill/repeat if continued headache 12 tablet 1    ibuprofen (ADVIL/MOTRIN) 200 MG tablet Take 600 mg by mouth every 6 hours as needed for pain      magnesium 250 MG  "tablet Take 1 tablet by mouth daily.      methylPREDNISolone (MEDROL) 32 MG tablet Please take one 32 mg Methylprednisolone tablet by mouth at both 12 hours and at 2 hours prior to diagnostic cerebral venogram procedure 2 tablet 0    NONFORMULARY P5P - For nervous system      NONFORMULARY Tribulus       No current facility-administered medications for this visit.     Vitals    /81   Pulse 102   Ht 1.773 m (5' 9.8\")   Wt 91 kg (200 lb 9.9 oz)   BMI 28.95 kg/m      Body surface area is 2.12 meters squared.    BMI Percentile: Body mass index is 28.95 kg/m .    Physical Exam    General: No acute distress  HEENT: Normocephalic, atraumatic  Respiratory: Non-labored breathing  Psych: Normal affect  Vascular: no lower extremity swelling, no lower extremity varices, no pelvic varices  Skin: ***  : ***    Diagnostics    Imaging    The following imaging has been personally reviewed, independently interpreted including pertinent findings, assessment, and how this interpretation will impact the patient's treatment plan:    CTV H/N (4/4/25): Bilateral internal jugular vein compressions at styloid process/lateral mass C1. Vertebral venous collaterals more prominent on left. No transverse sinus stenosis.    CTV A/P (4/18/25): LRV compression. LGV prominent. Left obturator escape vein. RGV arises at level of L2/3 disc at 10 o clock. No LCIV compression.    Jugular venogram (2/4/25): Ipsilateral head-turn jugular venous occlusion of both internal jugular veins.    Laboratory Values    HCG: None recent    CBC:   Recent Labs   Lab Test 02/15/23  0714   WBC 6.1   RBC 4.75   HGB 13.6   HCT 42.6   MCV 90   MCH 28.6   MCHC 31.9   RDW 12.9        Urinalysis (1 y/o): Occasional urine epithelial cells, otherwise unremarkable.     Assessment/Plan:    Assessment:    lEoisa Rene is a PHANI***P*** S***V***P*** with clinical history, physical exam, and imaging findings consistent with pelvic venous disease (PeVD). The patient is " amenable to {PeVD choices:080966}. The process of endovascular diagnostic evaluation and treatment, as well as the risks and benefits were discussed with the patient. The potential for delayed resolution of symptoms and potential for multiple procedures was also emphasized, given the complexity of pelvic pain and the commonality of overlapping pain syndromes. The patient will be scheduled in interventional radiology, accordingly.    Plan:    - Cerebral venogram  - OB/GYN consultation  - Pelvic PT consultation  - Gastroenterology consultation  - Trial MP for 3 months    Darnell Soto MD  , Interventional Radiology  [facility]    I spent 45 minutes on the date of the encounter doing chart review, history and exam, documentation and further activities as noted above.

## 2025-06-11 NOTE — PATIENT INSTRUCTIONS
Eloisa  you have had your consult today with Dr Darnell Soto regarding your pelvic pain.    Plan:    Referral to OB/Gyn to rule out Endometriosis.     Referral has been placed for Pelvic Physicial Therapy.     GI referral has been placed for your abdominal pain    Return to clinic in 6 months to see Dr Soto.  We will contact you closer to the date for scheduling    Thanks,     SPENCER Bryant RN, BSN  Interventional Radiology Care Coordinator   Phone:  [phone]

## 2025-06-11 NOTE — Clinical Note
6/11/2025      RE: Eloisa Rene  [address]  [facility] [address]     Dear Colleague,    Thank you for the opportunity to participate in the care of your patient, Eloisa Rene, at the [facility] at [facility]. Please see a copy of my visit note below.    [facility]  Interventional Radiology Clinic  [address]  [address] [place]  [address]    Encounter Date: 6/11/2025    Patient: Eloisa Rene     MRN: [id number]  YOB: 2005      Age: 20 year old  Sex: Female       Referring Provider: Dr. Doni BARRAZA Cha (Neurology)    Chief Complaint    Pelvic Pain    History of Present Illness    Eloisa Rene is a 20 year old old female presenting with pelvic pain since 2020. Her medical history is notable for POTS, small fiber neuropathy, Cushing syndrome, thoracic outlet syndrome, internal jugular vein compressions, and hypermobility. The pain has been present since 2020 and is described as pelvic heaviness, constant dull aching sensation with sharp or cramping exacerbations, tugging, and vaginal pressure. The location of the pain is across lower abdomen and is intermittent, typically bilateral. The frequency of pain flares is weekly and the typical duration is one day. The pain is worsened with prolonged standing or at the end of the day and improved with lying supine. The pain is worsened with sitting at a desk or driving. The pain is worsened with eating, defecation, menstruation and acidic foods. She endorses urinary frequency, as well as bloating. She does not have a history of depression or anxiety.    Her last menstrual period was June 1. Her menses are irregular and typically normal flow, but variable. She has seen gynecology in the past which resulted in a trial of multiple OCPs without benefit. She endorses some vaginal pain with standing/activity and some left lateral leg pain. She does not  endorse lower extremity swelling. She has not worn compression garments.    Nantes Criteria    [x] Pain in the territory of the pudendal nerve (e.g., vaginal, labial, perianal, perineal regions)  [] Pain worsened by sitting  [x] Not woken up at night by pain  [x] No objective sensory loss  [] A positive response to a pudendal nerve block    ----- ***  Period @ 11 y/o  Painful @ 13 y/o    2020  - urinary frequency  - bladder discomfort/pressure  - pelvic heaviness  - acid drinks make pain worse  - vertigo    2022 (April)  - POTS sx  - Heaviness holding head up  - Has    Had Margaret internal jugular venogram  Had petrosal sinus sampling with [place]    Has tried gluten, sugar, soy restriction without impact    No rx have helped; doesn't tolerate most rx very well    Has had cardiac work-up with tilt table test. U/s of UE showed ToS.    No relief from injection in SCMs.      *** -----        Patient Medical History    POTS  Small fiber neuropathy  Cushing syndrome  Thoracic outlet syndrome  Internal jugular vein compressions (bilateral)  Hypermobility    Past Surgical History:   Procedure Laterality Date    IR CAROTID CEREBRAL ANGIOGRAM BILATERAL  2/15/2023     Family and Social History    Family History   Problem Relation Age of Onset    Hypertension Mother     Hypertension Father     Diabetes Father     Congenital Anomalies Brother         Down's Syndrome     Mother and maternal grandfather with lower extremity varicose veins    Social History     Socioeconomic History    Marital status: Single   Tobacco Use    Smoking status: Never    Smokeless tobacco: Never     Allergies    Allergies   Allergen Reactions    Amphetamine-Dextroamphet Er Other (See Comments)     She did not feel well while taking     Desogestrel-Ethinyl Estradiol Unknown    Drospirenone-Ethinyl Estradiol Other (See Comments)     Suicidal thoughts     Fluoxetine Other (See Comments)     Suicidal thoughts      Medications    Current Outpatient  "Medications   Medication Sig Dispense Refill    furosemide (LASIX) 20 MG tablet Please take 20 mg lasix by mouth every 6 hours for 3 days if experiencing post Cerebral venogram headache, may refill/repeat if continued headache 12 tablet 1    ibuprofen (ADVIL/MOTRIN) 200 MG tablet Take 600 mg by mouth every 6 hours as needed for pain      magnesium 250 MG tablet Take 1 tablet by mouth daily.      methylPREDNISolone (MEDROL) 32 MG tablet Please take one 32 mg Methylprednisolone tablet by mouth at both 12 hours and at 2 hours prior to diagnostic cerebral venogram procedure 2 tablet 0    NONFORMULARY P5P - For nervous system      NONFORMULARY Tribulus       No current facility-administered medications for this visit.     Vitals    /81   Pulse 102   Ht 1.773 m (5' 9.8\")   Wt 91 kg (200 lb 9.9 oz)   BMI 28.95 kg/m      Body surface area is 2.12 meters squared.    BMI Percentile: Body mass index is 28.95 kg/m .    Physical Exam    General: No acute distress  HEENT: Normocephalic, atraumatic  Respiratory: Non-labored breathing  Psych: Normal affect  Vascular: no lower extremity swelling, no lower extremity varices, no pelvic varices  Skin: ***  : ***    Diagnostics    Imaging    The following imaging has been personally reviewed, independently interpreted including pertinent findings, assessment, and how this interpretation will impact the patient's treatment plan:    CTV H/N (4/4/25): Bilateral internal jugular vein compressions at styloid process/lateral mass C1. Vertebral venous collaterals more prominent on left. No transverse sinus stenosis.    CTV A/P (4/18/25): LRV compression. LGV prominent. Left obturator escape vein. RGV arises at level of L2/3 disc at 10 o clock. No LCIV compression.    Jugular venogram (2/4/25): Ipsilateral head-turn jugular venous occlusion of both internal jugular veins.    Laboratory Values    HCG: None recent    CBC:   Recent Labs   Lab Test 02/15/23  0714   WBC 6.1   RBC 4.75 "   HGB 13.6   HCT 42.6   MCV 90   MCH 28.6   MCHC 31.9   RDW 12.9        Urinalysis (1 y/o): Occasional urine epithelial cells, otherwise unremarkable.     Assessment/Plan:    Assessment:    Eloisa Rene is a G***P*** S***V***P*** with clinical history, physical exam, and imaging findings consistent with pelvic venous disease (PeVD). The patient is amenable to {PeVD choices:657259}. The process of endovascular diagnostic evaluation and treatment, as well as the risks and benefits were discussed with the patient. The potential for delayed resolution of symptoms and potential for multiple procedures was also emphasized, given the complexity of pelvic pain and the commonality of overlapping pain syndromes. The patient will be scheduled in interventional radiology, accordingly.    Plan:    - Cerebral venogram  - OB/GYN consultation  - Pelvic PT consultation  - Gastroenterology consultation  - Trial MPFF for 3 months    Darnell Soto MD  , Interventional Radiology  [facility]    I spent 45 minutes on the date of the encounter doing chart review, history and exam, documentation and further activities as noted above.      Please do not hesitate to contact me if you have any questions/concerns.     Sincerely,       Darnell Soto MD

## 2025-06-11 NOTE — NURSING NOTE
"[facility] [[id number]]  Chief Complaint   Patient presents with    Consult     New patient.        Initial /81   Pulse 102   Ht 5' 9.8\" (177.3 cm)   Wt 200 lb 9.9 oz (91 kg)   BMI 28.95 kg/m   Estimated body mass index is 28.95 kg/m  as calculated from the following:    Height as of this encounter: 5' 9.8\" (177.3 cm).    Weight as of this encounter: 200 lb 9.9 oz (91 kg).  Medication Reconciliation: complete    Does the patient need any medication refills today? No    Does the patient/parent have MyChart set up? Yes   Proxy access needed? No    Is the patient 18 or turning 18 in the next 2 months? No   If yes, make sure they have a Consent To Communicate on file      Shelby Wheat MA          "

## 2025-06-12 ENCOUNTER — PATIENT OUTREACH (OUTPATIENT)
Dept: CARE COORDINATION | Facility: CLINIC | Age: 20
End: 2025-06-12
Payer: COMMERCIAL

## 2025-06-16 ENCOUNTER — PATIENT OUTREACH (OUTPATIENT)
Dept: CARE COORDINATION | Facility: CLINIC | Age: 20
End: 2025-06-16
Payer: COMMERCIAL

## 2025-06-20 ENCOUNTER — APPOINTMENT (OUTPATIENT)
Dept: GENERAL RADIOLOGY | Facility: CLINIC | Age: 20
End: 2025-06-20
Attending: PEDIATRICS
Payer: COMMERCIAL

## 2025-06-20 ENCOUNTER — HOSPITAL ENCOUNTER (OUTPATIENT)
Facility: CLINIC | Age: 20
Discharge: HOME | End: 2025-06-20
Attending: PEDIATRICS | Admitting: STUDENT IN AN ORGANIZED HEALTH CARE EDUCATION/TRAINING PROGRAM
Payer: COMMERCIAL

## 2025-06-20 ENCOUNTER — APPOINTMENT (OUTPATIENT)
Dept: INTERVENTIONAL RADIOLOGY/VASCULAR | Facility: CLINIC | Age: 20
End: 2025-06-20
Attending: STUDENT IN AN ORGANIZED HEALTH CARE EDUCATION/TRAINING PROGRAM
Payer: COMMERCIAL

## 2025-06-20 VITALS
SYSTOLIC BLOOD PRESSURE: 117 MMHG | HEART RATE: 120 BPM | OXYGEN SATURATION: 96 % | DIASTOLIC BLOOD PRESSURE: 84 MMHG | TEMPERATURE: 97.7 F

## 2025-06-20 DIAGNOSIS — I87.1 COMPRESSION OF VEIN: ICD-10-CM

## 2025-06-20 PROCEDURE — 75860 VEIN X-RAY NECK: CPT | Mod: 26 | Performed by: STUDENT IN AN ORGANIZED HEALTH CARE EDUCATION/TRAINING PROGRAM

## 2025-06-20 PROCEDURE — 75860 VEIN X-RAY NECK: CPT | Mod: XS

## 2025-06-20 PROCEDURE — 36013 PLACE CATHETER IN ARTERY: CPT | Performed by: STUDENT IN AN ORGANIZED HEALTH CARE EDUCATION/TRAINING PROGRAM

## 2025-06-20 PROCEDURE — 36012 PLACE CATHETER IN VEIN: CPT | Performed by: STUDENT IN AN ORGANIZED HEALTH CARE EDUCATION/TRAINING PROGRAM

## 2025-06-20 PROCEDURE — 250N000009 HC RX 250: Performed by: STUDENT IN AN ORGANIZED HEALTH CARE EDUCATION/TRAINING PROGRAM

## 2025-06-20 PROCEDURE — 250N000011 HC RX IP 250 OP 636: Performed by: STUDENT IN AN ORGANIZED HEALTH CARE EDUCATION/TRAINING PROGRAM

## 2025-06-20 PROCEDURE — 76937 US GUIDE VASCULAR ACCESS: CPT

## 2025-06-20 PROCEDURE — 36012 PLACE CATHETER IN VEIN: CPT | Mod: 50

## 2025-06-20 PROCEDURE — 76937 US GUIDE VASCULAR ACCESS: CPT | Mod: 26 | Performed by: STUDENT IN AN ORGANIZED HEALTH CARE EDUCATION/TRAINING PROGRAM

## 2025-06-20 PROCEDURE — 272N000504 HC NEEDLE CR4

## 2025-06-20 PROCEDURE — C1887 CATHETER, GUIDING: HCPCS

## 2025-06-20 PROCEDURE — 255N000002 HC RX 255 OP 636: Performed by: STUDENT IN AN ORGANIZED HEALTH CARE EDUCATION/TRAINING PROGRAM

## 2025-06-20 PROCEDURE — C1769 GUIDE WIRE: HCPCS

## 2025-06-20 PROCEDURE — 272N000566 HC SHEATH CR3

## 2025-06-20 PROCEDURE — 75860 VEIN X-RAY NECK: CPT | Mod: 50

## 2025-06-20 PROCEDURE — 75870 VEIN X-RAY SKULL: CPT | Mod: 26 | Performed by: STUDENT IN AN ORGANIZED HEALTH CARE EDUCATION/TRAINING PROGRAM

## 2025-06-20 RX ORDER — HEPARIN SODIUM 200 [USP'U]/100ML
1 INJECTION, SOLUTION INTRAVENOUS EVERY 5 MIN PRN
Status: DISCONTINUED | OUTPATIENT
Start: 2025-06-20 | End: 2025-06-25 | Stop reason: HOSPADM

## 2025-06-20 RX ORDER — ONDANSETRON 4 MG/1
4 TABLET, ORALLY DISINTEGRATING ORAL ONCE
Status: COMPLETED | OUTPATIENT
Start: 2025-06-20 | End: 2025-06-20

## 2025-06-20 RX ORDER — IODIXANOL 320 MG/ML
50 INJECTION, SOLUTION INTRAVASCULAR ONCE
Status: COMPLETED | OUTPATIENT
Start: 2025-06-20 | End: 2025-06-20

## 2025-06-20 RX ADMIN — LIDOCAINE HYDROCHLORIDE 3 ML: 10 INJECTION, SOLUTION EPIDURAL; INFILTRATION; INTRACAUDAL; PERINEURAL at 12:53

## 2025-06-20 RX ADMIN — IODIXANOL 10 ML: 320 INJECTION, SOLUTION INTRAVASCULAR at 12:53

## 2025-06-20 RX ADMIN — ONDANSETRON 4 MG: 4 TABLET, ORALLY DISINTEGRATING ORAL at 13:16

## 2025-06-20 RX ADMIN — HEPARIN SODIUM 1 BAG: 200 INJECTION, SOLUTION INTRAVENOUS at 12:42

## 2025-06-20 ASSESSMENT — ACTIVITIES OF DAILY LIVING (ADL)
ADLS_ACUITY_SCORE: 42

## 2025-06-20 NOTE — DISCHARGE INSTRUCTIONS
Discharge Instructions after Peripheral Venogram  _____________________________________    Patient Name: Eloisa Rene  Today's Date: June 20, 2025    Today you had an venogram with Dr. Soto       When you go home, an adult should stay with you until morning. You should rest all day  except for going to the bathroom.  For 24 hours  Drink extra fluids. Eat a diet low in saturated fat. No smoking or alcohol.  Keep the bandage on. Check the puncture site every 2 to 3 hours while awake.  Do not drive or work heavy machinery.  Do not make any legal or other important decisions.  The day after your exam, you may take a shower. Wait five days before taking a bath.  For 2 days  If you cough, sneeze or laugh, press firmly on the puncture site.  Do not stoop or squat.  Do not have sex.  For 3 days  Avoid hard activity such as lifting, pushing or exercise. This will help prevent bleeding.  Keep the puncture site clean and dry. Keep it covered with a Band-aid until it heals.  Do not use lotion or powder near the site.  Keep drinking extra fluids.  : Do not swim or take a bath.    Medicines:  Take your other medicines, including blood thinners, unless your doctor tells you not to.    Follow-up:  None    Other instructions:  From now on, your diet should be low in cholesterol and saturated fat.  In the future, you will need to take antibiotic (germ-fighting) medicine before you have certain procedures (such as dental work, teeth cleaning, colonoscopy and others). Call your clinic if you have questions or need medicine.  If you received a stent: Carry your stent card with you at all times. If you ever have an MRI test, your care team will need to see the card.    If you have problems or questions  Call your doctor or radiology clinic if you have:  A fever of 101  (38.3  C) or higher (under the tongue)  Increased pain, redness or a hard lump at the puncture site.  Your arm or leg feels cool, numb or changes color.  New pain in the  back or lower belly that you cannot relieve with Tylenol.  If you have bleeding or increased swelling, lie down and press firmly on the site. If it s a small amount, call your clinic. If it s a large amount, call 911, even if the bleeding stops.    If you have questions or your original symptoms do not improve, call:    [facility]: [phone]. Ask for the radiologist on call.   [facility]: [phone]. Ask for the radiologist on call.

## 2025-06-20 NOTE — IR NOTE
Patient Name: Eloisa BONILLA Barnum  Medical Record Number: [id number]  Today's Date: 6/20/2025    Procedure: Cerebral venogram  Proceduralist: Dr. Soto  Pathology present: No    Procedure Start: 1212  Procedure end: 1300  Sedation medications administered: local lidocaine         Other Notes: Pt arrived to IR room 1 from pre/post 1. Consent reviewed. Pt denies any questions or concerns regarding procedure. Pt positioned supine and monitored per protocol. Pt tolerated procedure without any noted complications. Pt transferred back to pre/post 1.

## 2025-06-21 ASSESSMENT — ACTIVITIES OF DAILY LIVING (ADL)
ADLS_ACUITY_SCORE: 42

## 2025-06-22 ASSESSMENT — ACTIVITIES OF DAILY LIVING (ADL)
ADLS_ACUITY_SCORE: 42

## 2025-06-23 ASSESSMENT — ACTIVITIES OF DAILY LIVING (ADL)
ADLS_ACUITY_SCORE: 42

## 2025-06-24 ASSESSMENT — ACTIVITIES OF DAILY LIVING (ADL)
ADLS_ACUITY_SCORE: 42

## 2025-06-30 ENCOUNTER — TELEPHONE (OUTPATIENT)
Dept: NEUROLOGY | Facility: CLINIC | Age: 20
End: 2025-06-30
Payer: COMMERCIAL

## 2025-06-30 NOTE — TELEPHONE ENCOUNTER
Health Call Center    Phone Message    May a detailed message be left on voicemail: yes     Reason for Call: June 20th patient a procedure with Dr. Soto..  Patient needs a call back to discuss results and next steps   Patient thought Elizabeth would be the best to speak with      Action Taken: [facility]    Travel Screening: Not Applicable     Date of Service:

## 2025-07-03 ENCOUNTER — VIRTUAL VISIT (OUTPATIENT)
Dept: NEUROLOGY | Facility: CLINIC | Age: 20
End: 2025-07-03
Payer: COMMERCIAL

## 2025-07-03 VITALS — HEIGHT: 70 IN | WEIGHT: 200 LBS | BODY MASS INDEX: 28.63 KG/M2

## 2025-07-03 DIAGNOSIS — M35.7 HYPERMOBILITY SYNDROME: ICD-10-CM

## 2025-07-03 DIAGNOSIS — G24.3 CERVICAL DYSTONIA: Primary | ICD-10-CM

## 2025-07-03 DIAGNOSIS — G54.0 TOS (THORACIC OUTLET SYNDROME): ICD-10-CM

## 2025-07-03 DIAGNOSIS — M43.6 CONTRACTURE OF STERNOCLEIDOMASTOID MUSCLE: ICD-10-CM

## 2025-07-03 DIAGNOSIS — M24.20 EAGLE'S SYNDROME: ICD-10-CM

## 2025-07-03 DIAGNOSIS — R51.9 PRESSURE IN HEAD: ICD-10-CM

## 2025-07-03 DIAGNOSIS — M54.2 NECK PAIN: ICD-10-CM

## 2025-07-03 DIAGNOSIS — M53.2X2 CERVICAL SPINE INSTABILITY: ICD-10-CM

## 2025-07-03 DIAGNOSIS — I87.1 COMPRESSION OF VEIN: ICD-10-CM

## 2025-07-03 ASSESSMENT — PAIN SCALES - GENERAL: PAINLEVEL_OUTOF10: NO PAIN (0)

## 2025-07-03 NOTE — NURSING NOTE
Current patient location: [address] [place] [id number]    Is the patient currently in the state of [place]? YES    Visit mode: VIDEO    If the visit is dropped, the patient can be reconnected by:VIDEO VISIT: Text to cell phone:   Telephone Information:   Mobile [phone]       Will anyone else be joining the visit? NO  (If patient encounters technical issues they should call [phone])    Are changes needed to the allergy or medication list? No    Are refills needed on medications prescribed by this physician? NO    Rooming Documentation:  Questionnaire(s) not pre-assigned    Reason for visit: Follow Up    Laura SCRUGGS

## 2025-07-03 NOTE — LETTER
"7/3/2025       RE: Eloisa Rene  [address]  [place] [address]     Dear Colleague,    Thank you for referring your patient, Eloisa Rene, to the [facility] [place] at [facility]. Please see a copy of my visit note below.    The patient is being evaluated via a billable video visit.    How would you like to obtain your AVS? MyChart  If the video visit is dropped, the invitation should be resent by: Send to e-mail at: [email or web address]  Will anyone else be joining your video visit? No      Video-Visit Details  Type of service:  Video Visit  Video Start Time:1:40 PM  Video End Time:2:14 PM  Originating Location (pt. Location): Home  Distant Location (provider location):  [facility] [place]   Platform used for Video Visit: enrich-in    Follow-up 2-19-25  Eloisa Rene is a 20 year old female with POTS, TOS, small fiber neuropathy, Cushing's syndrome (high morning cortisol), painful and heavy menstrual cycles since 2018, rocking vertigo since 2020, exercise induced presyncope with frequent urination since 2021, took a big turn for the worse April 2022 with an unclear trigger. Only notable events was taking ciprofloxacin 2 weeks prior. Symptoms included a total body but upper body predominant vibrational sense, high pitched and pulsatile tinnitus, head and ear pressure, presyncope, palpitations, tachycardia, dilated pupils, neck weakness, head heaviness, generalized weakness, cold intolerance, shortness of breath, dysphagia, brain fog, and chronic fatigue.. Headaches seem to originate from her neck and shoulders and are exacerbated with arm movement.      She presented to Dr. Osei Robles on 1-17-25 with these symptoms. 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 A 2-4-25 venogram with Dr. Robles showed: \"Mild extrinsic compression of the right jugular vein at the level of C5-C6 at rest, widely patent " "left internal jugular vein at rest. With ipsilateral neck rotation and flexion there was complete occlusion of the jugular vein with extensive central reflux.\" She has been receiving physical therapy with minimal improvement.      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 She has some hypermobility as well. She has bilaterally positive upper limb tension test with head rotation that triggers tinnitus, chest pain, and rocking vertigo.  She has diffuse tenderness in the neck and thoracic area.       We discussed the multifocal nature of venous congestion disorders that can affect both the internal and subclavian veins.  A lot of the symptoms that patients get are from the shunting of blood that can go towards the vertebral veins into the spinal cord or back up towards the skull base.  She does have evidence of jugular vein compression based on her venogram.  She has positive findings for thoracic outlet syndrome as well as a history that is suggestive for pelvic venous congestion that can occur in young women with hypermobility.  The evaluation for all of these considerations can be quite extensive.  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  This could have prognostic implications for her undergoing an SCM partial myotomy and resection of the omohyoid muscle.  Typically patients who have concurrent internal jugular vein compression of atlas do not do as well with muscle surgery although they can still have some benefit.  We can also do a trial anesthetic block to the sternocleidomastoid and anterior scalene muscle to determine the role of muscular compressions and her symptoms..  Because of her age and hypermobility I think she would be a better candidate for surgery than for " repeated injections of neurotoxin in the long run.  However, she does have features of cervical dystonia and pectoralis minor dystonia for which neurotoxin injections may be helpful at least in the short-term.     Recommendations:  US TOS/IJV including IJV  CTV head and neck with head turning  CTV head and neck neutral and head flexion  CTV abdomen pelvis for pelvic venous congestion syndrome  Schedule for trial SCM/ASM lidocaine block  If helpful, then direct to surgery  If not helpful, then proceed to Xeomin injections for a longer observation period  Consider using a carbonic anhydrase inhibitor to lower intracranial pressure after imaging    Interim History 7/3/2025:  3-7-25 Trial lidocaine SCM/ASM injection  No improvement. Had some arm numbness and weakness immediately following the injection    ULTRASOUND UPPER EXTREMITY VEIN AND PPG THORACIC OUTLET SYNDROME BILATERAL 3/12/2025  L IJV occlusion with head extension, otherwise normal    CTV HEAD NECK W CONTRAST, 3/12/2025 --Head turned RIGHT and LEFT  IMPRESSION:   1. Moderate narrowing of the LEFT internal jugular vein anterior to the C1 transverse process with head turned to the right, resolving with head turned to the left. The physiologic/clinical significance of this finding is uncertain and can be seen in the absence of symptoms. No significant narrowing of the right internal jugular vein.  2. Elongated bilateral styloid processes with contiguous stylohyoid ligament calcification, of doubtful significance given the lack of significant associated venous narrowing.  3. Patent dural venous sinuses and major deep intracranial veins.    CTV HEAD NECK W CONTRAST, 4/4/2025 -- Head neutral and FLEXED  IMPRESSION:   1. Bilateral mild narrowing of the internal jugular veins between the styloid process and anterior arch of C1 in the neutral position. Narrowing is increased to moderate to severe with neck flexed. The physiologic/clinical significance of this finding  is uncertain and can be seen in the absence of symptoms.  2. Patent dural venous sinuses and major deep intracranial veins.    CTV ABDOMEN PELVIS W CONTRAST  4/18/2025   IMPRESSION:  1. There is minimal indentation/abutment of the right common iliac artery at the origin of the left common iliac vein, without significant stenosis, thrombus, or occlusion.  2. No current significant periuterine venous congestion, with provided history of pelvic congestion syndrome.   3. Slight narrowing of the left renal vein as it passes between the aorta and surgery mesenteric artery, however no dilatation of the gonadal veins. No evidence of nutcracker syndrome.    6-9-25 Flexion/Extension upright MRI at [facility]-- Negative for CCI    6-20-25 Cerebral venogram  Cerebral venography demonstrates bilateral collateral venous drainage into vertebral/paravertebral collaterals via sigmoid emissary veins.  The etiology of this collateral venous flow is unclear, as no hemodynamically significant venous compression is present. Bilateral internal jugular venous compression is present on the referenced CT in the flexed position at the level of the lateral mass of C1. This could explain the findings on this exam, though no direct evidence of this compression is present with either venography or manometry.    *Note that pressures were on the high side (18-21mmHg) and were higher with head turned to the RIGHT and with head FLEXed    Had already tried [facility] in Summer 2024 ([facility]no help  Had already tried PT for 8 months at [facility]--no help    Discussed possible options as next steps given evidence of tandem IJV compressions at C1 and muscular. Did not have relief with lidocaine injection. Would try a course of physical therapy and consider treating the upper compression first. If the C1 level compression is treated, she may be more responsive to treatment of the muscular compression with neurotoxin. Patient had  questions about cervical spine fusion. Despite the flexion/extension MRI being normal (done in upright position) she has a sense that her neck is too loose. I don't have much experience in this area but a spine fusion is a really big commitment. I would like to see whether a styloidectomy alone would be an option in her case.     PLAN:  Styloidectomy referral to Dr. Nic Louise  Start PT for posterior neck strengthening ([facility])      DATA:  I personally reviewed the following data.  CTV ABDOMEN PELVIS W CONTRAST  4/18/2025   FINDINGS:  LUNG BASES: Clear.     ABDOMEN:  Liver: Within normal limits.  Gallbladder: No gallstone. Nondilated.  Pancreas: Within normal limits.  Spleen: Within normal limits.  Adrenal glands: Normal in appearance.  Kidneys/ureters/bladder: Normal bilateral renal cortical enhancement.  No lesion/mass. No hydronephrosis or urinary tract calculi. Grossly  normal bladder.     Bowel/mesentery: Grossly normal lower esophagus and stomach. Normal  calibers of the large and small bowel.     PELVIS: Grossly normal pelvic structures. Retroverted uterus. Pelvic  phlebolith. Substantial stool within the rectum.     VASCULATURE AND LYMPH NODES: There is minimal indentation/abutment of  the right common iliac artery at the origin of the left common iliac  vein, without significant stenosis, thrombus, or occlusion of the left  common iliac vein. No current significant periuterine venous  congestion, with provided history of pelvic congestion syndrome.  Slight narrowing of the left renal vein passes between the aorta and  superior mesenteric artery. Gonadal veins are nondilated. Grossly  patent and nonaneurysmal major vasculature. No enlarged lymph nodes.     BONES AND SOFT TISSUES:  No acute or suspicious finding.                                                                 IMPRESSION:  1. There is minimal indentation/abutment of the right common iliac  artery at the  origin of the left common iliac vein, without  significant stenosis, thrombus, or occlusion.  2. No current significant periuterine venous congestion, with provided  history of pelvic congestion syndrome.   3. Slight narrowing of the left renal vein as it passes between the  aorta and surgery mesenteric artery, however no dilatation of the  gonadal veins. No evidence of nutcracker syndrome.     CTV HEAD NECK W CONTRAST, 4/4/2025   FINDINGS:  No thrombosis or stenosis of the major intracranial dural  sinuses or deep cerebral veins.      The right styloid process measures 32 mm. The left styloid process  measures 32 mm.     RIGHT:  In the neutral position, there is mild narrowing of the right internal  jugular vein anterior to the C1 transverse process. There is moderate  to severe narrowing with neck flexed.     LEFT:  In the neutral position, there is mild narrowing of the left internal  jugular vein anterior to the C1 transverse process. There is to  moderate to severe narrowing with neck flexed.     Clear paranasal sinuses and mastoid air cells. No periapical dental  lucencies. The imaged skull base, intracranial and orbital structures  are within normal limits. No suspicious finding in the visualized  superior mediastinum/thorax. Clear lung apices.                                                         IMPRESSION:   1. Bilateral mild narrowing of the internal jugular veins between the  styloid process and anterior arch of C1 in the neutral position.  Narrowing is increased to moderate to severe with neck flexed. The  physiologic/clinical significance of this finding is uncertain and can  be seen in the absence of symptoms.  2. Patent dural venous sinuses and major deep intracranial veins.     CTV HEAD NECK W CONTRAST, 3/12/2025   FINDINGS:  No thrombosis or stenosis of the major intracranial dural  sinuses or deep cerebral veins.      The right styloid process and contiguous calcified stylohyoid  ligament  measures 39 mm. The left styloid process and contiguous calcified  stylohyoid ligament measures 36 mm.     RIGHT:  With head turned to the right, there is no significant narrowing of  the right internal jugular vein. With head turned to the left, there  is very mild narrowing of the right internal jugular vein as it  courses along the right C1 transverse process with residual diameter  of 5 mm, of doubtful significance.     LEFT:  With head turned to the right, there is moderate narrowing of the left  internal jugular vein anterior to the C1 transverse process with  residual diameter of 3 mm. This resolves with head turned to the left.     Clear paranasal sinuses and mastoid air cells. No periapical dental  lucencies. The imaged skull base, intracranial and orbital structures  are within normal limits. No suspicious finding in the visualized  superior mediastinum/thorax. Clear lung apices.                                                         IMPRESSION:   1. Moderate narrowing of the left internal jugular vein anterior to  the C1 transverse process with head turned to the right, resolving  with head turned to the left. The physiologic/clinical significance of  this finding is uncertain and can be seen in the absence of symptoms.  No significant narrowing of the right internal jugular vein.  2. Elongated bilateral styloid processes with contiguous stylohyoid  ligament calcification, of doubtful significance given the lack of  significant associated venous narrowing.  3. Patent dural venous sinuses and major deep intracranial veins.     JEANIE FUENTES MD       ULTRASOUND UPPER EXTREMITY VEIN AND PPG THORACIC OUTLET SYNDROME BILATERAL 3/12/2025  RIGHT:       REST:            INTERNAL JUGULAR VEIN: 69 cm/s, phasic, fully compressible            INNOMINATE VEIN: 67 cm/s, phasic            SUBCLAVIAN VEIN, medial: 103 cm/s, phasic            SUBCLAVIAN VEIN, mid: 105 cm/s, phasic, fully compressible           "  SUBCLAVIAN VEIN, lateral: 99 cm/s, phasic, fully  compressible            AXILLARY VEIN: 99 cm/s, phasic, fully compressible          MID SUBCLAVIAN VEIN, sitting erect:            0 degrees: 92 cm/s, phasic            90 degrees: 66 cm/s, phasic            135 degrees: 45 cm/s, phasic            180 degrees: 135 cm/s, phasic          INTERNAL JUGULAR VEIN, sitting erect:            Neutral: 66 cm/s, phasic            Right: 140 cm/s, phasic            Left: 198 cm/s, phasic            Extension: 236 cm/s, phasic            Flexion: 163 cm/s, phasic          PPGs:            Baseline: Diminished at baseline compared to the left.  \"Normal\"            Arms 90: \"Normal\"            Arms 180: ABNORMAL - OCCLUDED              : \"Normal\"             head right: \"Normal\"             head left: \"Normal\"     LEFT:       REST:            INTERNAL JUGULAR VEIN: 128 cm/s, phasic, fully compressible            INNOMINATE VEIN: 46 cm/s, phasic            SUBCLAVIAN VEIN, medial: 58 cm/s, phasic            SUBCLAVIAN VEIN, mid: 140 cm/s, phasic, fully compressible            SUBCLAVIAN VEIN, lateral: 71 cm/s, phasic, fully  compressible            AXILLARY VEIN: 51 cm/s, phasic, fully compressible          MID SUBCLAVIAN VEIN, sitting erect:            0 degrees: 90 cm/s, phasic            90 degrees: 60 cm/s, phasic            135 degrees: 117 cm/s, phasic            180 degrees: 16 cm/s, phasic          INTERNAL JUGULAR VEIN, sitting erect:            Neutral: 152 cm/s, phasic            Right: 180 cm/s, phasic            Left: 138 cm/s, phasic            Extension: 0 cm/s, occludes            Flexion: 82 cm/s, phasic         PPGs:            Baseline: Normal            Arms 90: Normal            Arms 180: ABNORMAL - diminished - nearly occlusive            : ABNORMAL - diminished - nearly occlusive             head right: Normal             head left: Normal                       " "                                     IMPRESSION: Thoracic outlet/inlet physiology suggested. Correlate for  symptoms.     1. RIGHT:       A. No subclavian venous stenosis suggested at rest.       B. No subclavian venous occlusion demonstrated with maneuvers.       C. No internal jugular venous occlusion demonstrated with  maneuvers.       D. PPG diminished at baseline compared to the left, may suggest a  stenosis. PPG occludes in Arms 180.     2. LEFT:       A. No subclavian venous stenosis suggested at rest.       B. No subclavian venous occlusion demonstrated with maneuvers.       C. Internal jugular vein occludes with head extension.       D. PPG nearly occludes in Arms 180 and  positions.     IR Jugular Sinus Venogram Left  Narrative: Procedure:     1. Ultrasound-guided right right basilic vein access  2. Catheter selection, venography and manometry of the superior  sagittal sinus  3. Catheter selection, venography and manometry of the right  transverse sinus, right sigmoid sinus, and right internal jugular vein  4. Catheter selection, venography and manometry of the left transverse  sinus, left sigmoid sinus, and left internal jugular vein  5. Catheter selection and manometry of the left brachiocephalic vein  and right atrium    Indication:  20 years Female with cervical dystonia.     Date: 6/20/2025 1:14 PM    Comparison: CT (4/4/25)    Operators: Dr. Soto    Anesthesia: None    Medications:   1% lidocaine local    Contrast: Visipaque    Fluoro time: 6.3 minutes.    Equipment:  - 21G/5F Micropuncture access set  - 0.035\" Bentson  - 0.035\" Angled glidewire  - 4F Angled glide catheter  - 5F Vascular sheath    Complications:  None  immediate    TECHNIQUE:     The risks, benefits, and alternatives to the procedure were explained  to the patient. The patient was placed supine. The right arm was  prepped and draped in the usual sterile fashion.    Venous Access    Under direct ultrasound guidance, the right " "basilic vein was accessed  with a 21G micropuncture needle. A 0.018\" wire was advanced through  the needle into the right atrium. A 5 [place] transition dilator was  exchanged for the needle over the 0.018\" wire. A 0.035\" wire was then  advanced through transitional dilator and into the IVC. A 5F vascular  sheath was then delivered to the level of the right subclavian vein.    Superior Sagittal Sinus, Right Transverse Sinus, Right Sigmoid Sinus,  and Right Internal Jugular Venography and Manometry    A 0.035\" angled glidewire and 4F angled glide catheter were used to  select the superior sagittal sinus. Superior sagittal sinus venography  and manometry were then performed in neutral, head right, head left,  and head flexed positions. Superior sagittal sinus venography was used  to determine anatomy and drainage dominance. The catheter was then  retracted into the torcula, right transverse sinus, right transverse  sinus-sigmoid sinus junction, right sigmoid sinus, and right internal  jugular vein at the bulb, C4 and C6 levels with manometry performed at  each station in neutral, head right, head left, and head flexed  positions. Finally, the catheter was used to select the right atrium  where manometry was performed.    Superior sagittal sinus (Neutral): 17 mmHg  Superior sagittal sinus (Head right): 21 mmHg  Superior sagittal sinus (Head left): 18 mmHg  Superior sagittal sinus (Head flexed): 18 mmHg    Torcula (Neutral): 18 mmHg  Torcula (Head right): 21 mmHg  Torcula (Head left): 18 mmHg  Torcula (Head flexed): 18 mmHg    Right transverse sinus (Neutral): 16 mmHg  Right transverse sinus (Head right): 20 mmHg  Right transverse sinus (Head left): 20 mmHg  Right transverse sinus (Head flexed): 18 mmHg    Right transverse sinus-sigmoid sinus junction (Neutral): 15 mmHg  Right transverse sinus-sigmoid sinus junction (Head right): 19 mmHg  Right transverse sinus-sigmoid sinus junction (Head left): 17 mmHg  Right " "transverse sinus-sigmoid sinus junction (Head flexed): 15 mmHg    Right sigmoid sinus (Neutral): 15 mmHg  Right sigmoid sinus (Head right): 19 mmHg  Right sigmoid sinus (Head left): 16 mmHg  Right sigmoid sinus (Head flexed): 16 mmHg    Right internal jugular vein [Bulb] (Neutral): 14 mmHg  Right internal jugular vein [Bulb] (Head right): 19 mmHg  Right internal jugular vein [Bulb] (Head left): 14 mmHg  Right internal jugular vein [Bulb] (Head flexed): 14 mmHg    Right internal jugular vein [C4] (Neutral): 13 mmHg  Right internal jugular vein [C4] (Head right): 18 mmHg  Right internal jugular vein [C4] (Head left): 10 mmHg  Right internal jugular vein [C4] (Head flexed): 13 mmHg    Right internal jugular vein [C6] (Neutral): 13 mmHg  Right internal jugular vein [C6] (Head right): 18 mmHg  Right internal jugular vein [C6] (Head left): 10 mmHg  Right internal jugular vein [C6] (Head flexed): 11 mmHg    Right atrial pressure (Neutral): 9 mmHg    Blood pressure at time of right atrial manometry: 122/74 mmHg    Left Transverse Sinus, Left Sigmoid Sinus, and Left Internal Jugular  Venography and Manometry    The 0.035\" angled glidewire and 4F angled glide catheter were used to  select the left transverse sinus. Venography and manometry were then  performed in neutral, head right, head left, and head flexed  positions. The catheter was then retracted into the left transverse  sinus-sigmoid sinus junction, left sigmoid sinus, and left internal  jugular vein at the bulb, C4 and C6 levels with manometry performed at  each station in neutral, head right, and head left positions. Finally,  the catheter was used to select the left brachiocephalic vein and  right atrium where manometry was performed.    Left transverse sinus (Neutral): 14 mmHg  Left transverse sinus (Head right): 18 mmHg  Left transverse sinus (Head left): 18 mmHg  Left transverse sinus (Head flexed): 17 mmHg    Left transverse sinus-sigmoid sinus junction " (Neutral): 13 mmHg  Left transverse sinus-sigmoid sinus junction (Head right): 17 mmHg  Left transverse sinus-sigmoid sinus junction (Head left): 16 mmHg  Left transverse sinus-sigmoid sinus junction (Head flexed): 16 mmHg    Left sigmoid sinus (Neutral): 13 mmHg  Left sigmoid sinus (Head right): 17 mmHg  Left sigmoid sinus (Head left): 16 mmHg  Left sigmoid sinus (Head flexed): 18 mmHg    Left internal jugular vein [Bulb] (Neutral): 13 mmHg  Left internal jugular vein [Bulb] (Head right): 17 mmHg  Left internal jugular vein [Bulb] (Head left): 15 mmHg  Left internal jugular vein [Bulb] (Head flexed): 16 mmHg    Left internal jugular vein [C4] (Neutral): 13 mmHg  Left internal jugular vein [C4] (Head right): 12 mmHg  Left internal jugular vein [C4] (Head left): 16 mmHg  Left internal jugular vein [C4] (Head flexed): 17 mmHg    Left internal jugular vein [C6] (Neutral): 13 mmHg  Left internal jugular vein [C6] (Head right): 11 mmHg  Left internal jugular vein [C6] (Head left): 16 mmHg  Left internal jugular vein [C6] (Head flexed): 13 mmHg    Left brachiocephalic vein pressure (Neutral): 10 mmHg  Right atrial pressure (Neutral): 9 mmHg    Blood pressure at time of right atrial manometry: 117/84 mmHg    The sheath was removed. A bandage was applied. There were no immediate  complications. Images were stored in PACS. The patient tolerated the  procedure well, and left the angiography suite in stable condition.    FINDINGS:    1. Sonographic evaluation of the right basilic vein confirms vessel  patency without thrombus. Image of needle entry sent to PACS.    2. Superior sagittal sinus venography demonstrates co-dominance in the  head-neutral position with bilateral collateral venous drainage via  sigmoid emissary veins into the vertebral and paravertebral veins.  Venography with the head rotated right demonstrates shift to  right-dominant drainage, with decrease in collateral venous drainage.  Venography with the head  rotated left demonstrates co-dominant venous  drainage and collateral venous drainage via vertebral/paravertebral  venous collaterals from a left-sided sigmoid emissary vein.    3. Left transverse sinus venography demonstrates collateral venous  drainage via sigmoid emissary veins into the vertebral and  paravertebral veins in the head-neutral position. Venography with the  head rotated right demonstrates resolution of collateral venous  opacification. Venography with the head rotated left demonstrates  collateral venous drainage via sigmoid emissary veins into the  vertebral and paravertebral veins.  _____________________  Impression: IMPRESSION:     Cerebral venography demonstrates bilateral collateral venous drainage  into vertebral/paravertebral collaterals via sigmoid emissary veins.  The etiology of this collateral venous flow is unclear, as no  hemodynamically significant venous compression is present. Bilateral  internal jugular venous compression is present on the referenced CT in  the flexed position at the level of the lateral mass of C1. This could  explain the findings on this exam, though no direct evidence of this  compression is present with either venography or manometry.     MONICA FU MD         SYSTEM ID:  [id number]      The longitudinal plan of care for the diagnosis(es)/condition(s) as documented were addressed during this visit. Due to the added complexity in care, I will continue to support Eloisa in the subsequent management and with ongoing continuity of care.    59-minutes were spent in evaluation, counseling, and documentation on the date of service.      Again, thank you for allowing me to participate in the care of your patient.      Sincerely,    Doni RIBERA Cha, MD

## 2025-07-03 NOTE — PROGRESS NOTES
"The patient is being evaluated via a billable video visit.    How would you like to obtain your AVS? MyChart  If the video visit is dropped, the invitation should be resent by: Send to e-mail at: [email or web address]  Will anyone else be joining your video visit? No      Video-Visit Details  Type of service:  Video Visit  Video Start Time:1:40 PM  Video End Time:2:14 PM  Originating Location (pt. Location): Home  Distant Location (provider location):  [facility] NEUROLOGY [facility]   Platform used for Video Visit: [facility]    Follow-up 2-19-25  Eloisa Rene is a 20 year old female with POTS, TOS, small fiber neuropathy, Cushing's syndrome (high morning cortisol), painful and heavy menstrual cycles since 2018, rocking vertigo since 2020, exercise induced presyncope with frequent urination since 2021, took a big turn for the worse April 2022 with an unclear trigger. Only notable events was taking ciprofloxacin 2 weeks prior. Symptoms included a total body but upper body predominant vibrational sense, high pitched and pulsatile tinnitus, head and ear pressure, presyncope, palpitations, tachycardia, dilated pupils, neck weakness, head heaviness, generalized weakness, cold intolerance, shortness of breath, dysphagia, brain fog, and chronic fatigue.. Headaches seem to originate from her neck and shoulders and are exacerbated with arm movement.      She presented to Dr. Osei Robles on 1-17-25 with these symptoms. 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 A 2-4-25 venogram with Dr. Robles showed: \"Mild extrinsic compression of the right jugular vein at the level of C5-C6 at rest, widely patent left internal jugular vein at rest. With ipsilateral neck rotation and flexion there was complete occlusion of the jugular vein with extensive central reflux.\" She has been receiving physical therapy with minimal improvement.      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 She has some hypermobility as well. She has bilaterally positive upper limb tension test with head rotation that triggers tinnitus, chest pain, and rocking vertigo.  She has diffuse tenderness in the neck and thoracic area.       We discussed the multifocal nature of venous congestion disorders that can affect both the internal and subclavian veins.  A lot of the symptoms that patients get are from the shunting of blood that can go towards the vertebral veins into the spinal cord or back up towards the skull base.  She does have evidence of jugular vein compression based on her venogram.  She has positive findings for thoracic outlet syndrome as well as a history that is suggestive for pelvic venous congestion that can occur in young women with hypermobility.  The evaluation for all of these considerations can be quite extensive.  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  This could have prognostic implications for her undergoing an SCM partial myotomy and resection of the omohyoid muscle.  Typically patients who have concurrent internal jugular vein compression of atlas do not do as well with muscle surgery although they can still have some benefit.  We can also do a trial anesthetic block to the sternocleidomastoid and anterior scalene muscle to determine the role of muscular compressions and her symptoms..  Because of her age and hypermobility I think she would be a better candidate for surgery than for repeated injections of neurotoxin in the long run.  However, she does have features of cervical dystonia and pectoralis minor dystonia for which neurotoxin injections may be helpful at least in the short-term.     Recommendations:  US TOS/IJV including IJV  CTV head and neck with head turning  CTV head  and neck neutral and head flexion  CTV abdomen pelvis for pelvic venous congestion syndrome  Schedule for trial SCM/ASM lidocaine block  If helpful, then direct to surgery  If not helpful, then proceed to Xeomin injections for a longer observation period  Consider using a carbonic anhydrase inhibitor to lower intracranial pressure after imaging    Interim History 7/3/2025:  3-7-25 Trial lidocaine SCM/ASM injection  No improvement. Had some arm numbness and weakness immediately following the injection    ULTRASOUND UPPER EXTREMITY VEIN AND PPG THORACIC OUTLET SYNDROME BILATERAL 3/12/2025  L IJV occlusion with head extension, otherwise normal    CTV HEAD NECK W CONTRAST, 3/12/2025 --Head turned RIGHT and LEFT  IMPRESSION:   1. Moderate narrowing of the LEFT internal jugular vein anterior to the C1 transverse process with head turned to the right, resolving with head turned to the left. The physiologic/clinical significance of this finding is uncertain and can be seen in the absence of symptoms. No significant narrowing of the right internal jugular vein.  2. Elongated bilateral styloid processes with contiguous stylohyoid ligament calcification, of doubtful significance given the lack of significant associated venous narrowing.  3. Patent dural venous sinuses and major deep intracranial veins.    CTV HEAD NECK W CONTRAST, 4/4/2025 -- Head neutral and FLEXED  IMPRESSION:   1. Bilateral mild narrowing of the internal jugular veins between the styloid process and anterior arch of C1 in the neutral position. Narrowing is increased to moderate to severe with neck flexed. The physiologic/clinical significance of this finding is uncertain and can be seen in the absence of symptoms.  2. Patent dural venous sinuses and major deep intracranial veins.    CTV ABDOMEN PELVIS W CONTRAST  4/18/2025   IMPRESSION:  1. There is minimal indentation/abutment of the right common iliac artery at the origin of the left common iliac vein,  without significant stenosis, thrombus, or occlusion.  2. No current significant periuterine venous congestion, with provided history of pelvic congestion syndrome.   3. Slight narrowing of the left renal vein as it passes between the aorta and surgery mesenteric artery, however no dilatation of the gonadal veins. No evidence of nutcracker syndrome.    6-9-25 Flexion/Extension upright MRI at [facility], [place]-- Negative for CCI    6-20-25 Cerebral venogram  Cerebral venography demonstrates bilateral collateral venous drainage into vertebral/paravertebral collaterals via sigmoid emissary veins.  The etiology of this collateral venous flow is unclear, as no hemodynamically significant venous compression is present. Bilateral internal jugular venous compression is present on the referenced CT in the flexed position at the level of the lateral mass of C1. This could explain the findings on this exam, though no direct evidence of this compression is present with either venography or manometry.    *Note that pressures were on the high side (18-21mmHg) and were higher with head turned to the RIGHT and with head FLEXed    Had [facility]  Had already tried PT for 8 months at [facility]--no help    Discussed possible options as next steps given evidence of tandem IJV compressions at C1 and muscular. Did not have relief with lidocaine injection. Would try a course of physical therapy and consider treating the upper compression first. If the C1 level compression is treated, she may be more responsive to treatment of the muscular compression with neurotoxin. Patient had questions about cervical spine fusion. Despite the flexion/extension MRI being normal (done in upright position) she has a sense that her neck is too loose. I don't have much experience in this area but a spine fusion is a really big commitment. I would like to see whether a styloidectomy alone would be an  option in her case.     PLAN:  Styloidectomy referral to Dr. Nic Louise  Start PT for posterior neck strengthening ([facility])      DATA:  I personally reviewed the following data.  CTV ABDOMEN PELVIS W CONTRAST  4/18/2025   FINDINGS:  LUNG BASES: Clear.     ABDOMEN:  Liver: Within normal limits.  Gallbladder: No gallstone. Nondilated.  Pancreas: Within normal limits.  Spleen: Within normal limits.  Adrenal glands: Normal in appearance.  Kidneys/ureters/bladder: Normal bilateral renal cortical enhancement.  No lesion/mass. No hydronephrosis or urinary tract calculi. Grossly  normal bladder.     Bowel/mesentery: Grossly normal lower esophagus and stomach. Normal  calibers of the large and small bowel.     PELVIS: Grossly normal pelvic structures. Retroverted uterus. Pelvic  phlebolith. Substantial stool within the rectum.     VASCULATURE AND LYMPH NODES: There is minimal indentation/abutment of  the right common iliac artery at the origin of the left common iliac  vein, without significant stenosis, thrombus, or occlusion of the left  common iliac vein. No current significant periuterine venous  congestion, with provided history of pelvic congestion syndrome.  Slight narrowing of the left renal vein passes between the aorta and  superior mesenteric artery. Gonadal veins are nondilated. Grossly  patent and nonaneurysmal major vasculature. No enlarged lymph nodes.     BONES AND SOFT TISSUES:  No acute or suspicious finding.                                                                 IMPRESSION:  1. There is minimal indentation/abutment of the right common iliac  artery at the origin of the left common iliac vein, without  significant stenosis, thrombus, or occlusion.  2. No current significant periuterine venous congestion, with provided  history of pelvic congestion syndrome.   3. Slight narrowing of the left renal vein as it passes between the  aorta and surgery mesenteric artery,  however no dilatation of the  gonadal veins. No evidence of nutcracker syndrome.     CTV HEAD NECK W CONTRAST, 4/4/2025   FINDINGS:  No thrombosis or stenosis of the major intracranial dural  sinuses or deep cerebral veins.      The right styloid process measures 32 mm. The left styloid process  measures 32 mm.     RIGHT:  In the neutral position, there is mild narrowing of the right internal  jugular vein anterior to the C1 transverse process. There is moderate  to severe narrowing with neck flexed.     LEFT:  In the neutral position, there is mild narrowing of the left internal  jugular vein anterior to the C1 transverse process. There is to  moderate to severe narrowing with neck flexed.     Clear paranasal sinuses and mastoid air cells. No periapical dental  lucencies. The imaged skull base, intracranial and orbital structures  are within normal limits. No suspicious finding in the visualized  superior mediastinum/thorax. Clear lung apices.                                                         IMPRESSION:   1. Bilateral mild narrowing of the internal jugular veins between the  styloid process and anterior arch of C1 in the neutral position.  Narrowing is increased to moderate to severe with neck flexed. The  physiologic/clinical significance of this finding is uncertain and can  be seen in the absence of symptoms.  2. Patent dural venous sinuses and major deep intracranial veins.     CTV HEAD NECK W CONTRAST, 3/12/2025   FINDINGS:  No thrombosis or stenosis of the major intracranial dural  sinuses or deep cerebral veins.      The right styloid process and contiguous calcified stylohyoid ligament  measures 39 mm. The left styloid process and contiguous calcified  stylohyoid ligament measures 36 mm.     RIGHT:  With head turned to the right, there is no significant narrowing of  the right internal jugular vein. With head turned to the left, there  is very mild narrowing of the right internal jugular vein as  it  courses along the right C1 transverse process with residual diameter  of 5 mm, of doubtful significance.     LEFT:  With head turned to the right, there is moderate narrowing of the left  internal jugular vein anterior to the C1 transverse process with  residual diameter of 3 mm. This resolves with head turned to the left.     Clear paranasal sinuses and mastoid air cells. No periapical dental  lucencies. The imaged skull base, intracranial and orbital structures  are within normal limits. No suspicious finding in the visualized  superior mediastinum/thorax. Clear lung apices.                                                         IMPRESSION:   1. Moderate narrowing of the left internal jugular vein anterior to  the C1 transverse process with head turned to the right, resolving  with head turned to the left. The physiologic/clinical significance of  this finding is uncertain and can be seen in the absence of symptoms.  No significant narrowing of the right internal jugular vein.  2. Elongated bilateral styloid processes with contiguous stylohyoid  ligament calcification, of doubtful significance given the lack of  significant associated venous narrowing.  3. Patent dural venous sinuses and major deep intracranial veins.     JEANIE FUENTES MD       ULTRASOUND UPPER EXTREMITY VEIN AND PPG THORACIC OUTLET SYNDROME BILATERAL 3/12/2025  RIGHT:       REST:            INTERNAL JUGULAR VEIN: 69 cm/s, phasic, fully compressible            INNOMINATE VEIN: 67 cm/s, phasic            SUBCLAVIAN VEIN, medial: 103 cm/s, phasic            SUBCLAVIAN VEIN, mid: 105 cm/s, phasic, fully compressible            SUBCLAVIAN VEIN, lateral: 99 cm/s, phasic, fully  compressible            AXILLARY VEIN: 99 cm/s, phasic, fully compressible          MID SUBCLAVIAN VEIN, sitting erect:            0 degrees: 92 cm/s, phasic            90 degrees: 66 cm/s, phasic            135 degrees: 45 cm/s, phasic            180 degrees: 135 cm/s,  "phasic          INTERNAL JUGULAR VEIN, sitting erect:            Neutral: 66 cm/s, phasic            Right: 140 cm/s, phasic            Left: 198 cm/s, phasic            Extension: 236 cm/s, phasic            Flexion: 163 cm/s, phasic          PPGs:            Baseline: Diminished at baseline compared to the left.  \"Normal\"            Arms 90: \"Normal\"            Arms 180: ABNORMAL - OCCLUDED              : \"Normal\"             head right: \"Normal\"             head left: \"Normal\"     LEFT:       REST:            INTERNAL JUGULAR VEIN: 128 cm/s, phasic, fully compressible            INNOMINATE VEIN: 46 cm/s, phasic            SUBCLAVIAN VEIN, medial: 58 cm/s, phasic            SUBCLAVIAN VEIN, mid: 140 cm/s, phasic, fully compressible            SUBCLAVIAN VEIN, lateral: 71 cm/s, phasic, fully  compressible            AXILLARY VEIN: 51 cm/s, phasic, fully compressible          MID SUBCLAVIAN VEIN, sitting erect:            0 degrees: 90 cm/s, phasic            90 degrees: 60 cm/s, phasic            135 degrees: 117 cm/s, phasic            180 degrees: 16 cm/s, phasic          INTERNAL JUGULAR VEIN, sitting erect:            Neutral: 152 cm/s, phasic            Right: 180 cm/s, phasic            Left: 138 cm/s, phasic            Extension: 0 cm/s, occludes            Flexion: 82 cm/s, phasic         PPGs:            Baseline: Normal            Arms 90: Normal            Arms 180: ABNORMAL - diminished - nearly occlusive            : ABNORMAL - diminished - nearly occlusive             head right: Normal             head left: Normal                                                            IMPRESSION: Thoracic outlet/inlet physiology suggested. Correlate for  symptoms.     1. RIGHT:       A. No subclavian venous stenosis suggested at rest.       B. No subclavian venous occlusion demonstrated with maneuvers.       C. No internal jugular venous occlusion demonstrated " "with  maneuvers.       D. PPG diminished at baseline compared to the left, may suggest a  stenosis. PPG occludes in Arms 180.     2. LEFT:       A. No subclavian venous stenosis suggested at rest.       B. No subclavian venous occlusion demonstrated with maneuvers.       C. Internal jugular vein occludes with head extension.       D. PPG nearly occludes in Arms 180 and  positions.     IR Jugular Sinus Venogram Left  Narrative: Procedure:     1. Ultrasound-guided right right basilic vein access  2. Catheter selection, venography and manometry of the superior  sagittal sinus  3. Catheter selection, venography and manometry of the right  transverse sinus, right sigmoid sinus, and right internal jugular vein  4. Catheter selection, venography and manometry of the left transverse  sinus, left sigmoid sinus, and left internal jugular vein  5. Catheter selection and manometry of the left brachiocephalic vein  and right atrium    Indication:  20 years Female with cervical dystonia.     Date: 6/20/2025 1:14 PM    Comparison: CT (4/4/25)    Operators: Dr. Soto    Anesthesia: None    Medications:   1% lidocaine local    Contrast: Visipaque    Fluoro time: 6.3 minutes.    Equipment:  - 21G/5F Micropuncture access set  - 0.035\" Bentson  - 0.035\" Angled glidewire  - 4F Angled glide catheter  - 5F Vascular sheath    Complications:  None  immediate    TECHNIQUE:     The risks, benefits, and alternatives to the procedure were explained  to the patient. The patient was placed supine. The right arm was  prepped and draped in the usual sterile fashion.    Venous Access    Under direct ultrasound guidance, the right basilic vein was accessed  with a 21G micropuncture needle. A 0.018\" wire was advanced through  the needle into the right atrium. A 5 [place] transition dilator was  exchanged for the needle over the 0.018\" wire. A 0.035\" wire was then  advanced through transitional dilator and into the IVC. A 5F vascular  sheath was " "then delivered to the level of the right subclavian vein.    Superior Sagittal Sinus, Right Transverse Sinus, Right Sigmoid Sinus,  and Right Internal Jugular Venography and Manometry    A 0.035\" angled glidewire and 4F angled glide catheter were used to  select the superior sagittal sinus. Superior sagittal sinus venography  and manometry were then performed in neutral, head right, head left,  and head flexed positions. Superior sagittal sinus venography was used  to determine anatomy and drainage dominance. The catheter was then  retracted into the torcula, right transverse sinus, right transverse  sinus-sigmoid sinus junction, right sigmoid sinus, and right internal  jugular vein at the bulb, C4 and C6 levels with manometry performed at  each station in neutral, head right, head left, and head flexed  positions. Finally, the catheter was used to select the right atrium  where manometry was performed.    Superior sagittal sinus (Neutral): 17 mmHg  Superior sagittal sinus (Head right): 21 mmHg  Superior sagittal sinus (Head left): 18 mmHg  Superior sagittal sinus (Head flexed): 18 mmHg    Torcula (Neutral): 18 mmHg  Torcula (Head right): 21 mmHg  Torcula (Head left): 18 mmHg  Torcula (Head flexed): 18 mmHg    Right transverse sinus (Neutral): 16 mmHg  Right transverse sinus (Head right): 20 mmHg  Right transverse sinus (Head left): 20 mmHg  Right transverse sinus (Head flexed): 18 mmHg    Right transverse sinus-sigmoid sinus junction (Neutral): 15 mmHg  Right transverse sinus-sigmoid sinus junction (Head right): 19 mmHg  Right transverse sinus-sigmoid sinus junction (Head left): 17 mmHg  Right transverse sinus-sigmoid sinus junction (Head flexed): 15 mmHg    Right sigmoid sinus (Neutral): 15 mmHg  Right sigmoid sinus (Head right): 19 mmHg  Right sigmoid sinus (Head left): 16 mmHg  Right sigmoid sinus (Head flexed): 16 mmHg    Right internal jugular vein [Bulb] (Neutral): 14 mmHg  Right internal jugular vein [Bulb] " "(Head right): 19 mmHg  Right internal jugular vein [Bulb] (Head left): 14 mmHg  Right internal jugular vein [Bulb] (Head flexed): 14 mmHg    Right internal jugular vein [C4] (Neutral): 13 mmHg  Right internal jugular vein [C4] (Head right): 18 mmHg  Right internal jugular vein [C4] (Head left): 10 mmHg  Right internal jugular vein [C4] (Head flexed): 13 mmHg    Right internal jugular vein [C6] (Neutral): 13 mmHg  Right internal jugular vein [C6] (Head right): 18 mmHg  Right internal jugular vein [C6] (Head left): 10 mmHg  Right internal jugular vein [C6] (Head flexed): 11 mmHg    Right atrial pressure (Neutral): 9 mmHg    Blood pressure at time of right atrial manometry: 122/74 mmHg    Left Transverse Sinus, Left Sigmoid Sinus, and Left Internal Jugular  Venography and Manometry    The 0.035\" angled glidewire and 4F angled glide catheter were used to  select the left transverse sinus. Venography and manometry were then  performed in neutral, head right, head left, and head flexed  positions. The catheter was then retracted into the left transverse  sinus-sigmoid sinus junction, left sigmoid sinus, and left internal  jugular vein at the bulb, C4 and C6 levels with manometry performed at  each station in neutral, head right, and head left positions. Finally,  the catheter was used to select the left brachiocephalic vein and  right atrium where manometry was performed.    Left transverse sinus (Neutral): 14 mmHg  Left transverse sinus (Head right): 18 mmHg  Left transverse sinus (Head left): 18 mmHg  Left transverse sinus (Head flexed): 17 mmHg    Left transverse sinus-sigmoid sinus junction (Neutral): 13 mmHg  Left transverse sinus-sigmoid sinus junction (Head right): 17 mmHg  Left transverse sinus-sigmoid sinus junction (Head left): 16 mmHg  Left transverse sinus-sigmoid sinus junction (Head flexed): 16 mmHg    Left sigmoid sinus (Neutral): 13 mmHg  Left sigmoid sinus (Head right): 17 mmHg  Left sigmoid sinus (Head " left): 16 mmHg  Left sigmoid sinus (Head flexed): 18 mmHg    Left internal jugular vein [Bulb] (Neutral): 13 mmHg  Left internal jugular vein [Bulb] (Head right): 17 mmHg  Left internal jugular vein [Bulb] (Head left): 15 mmHg  Left internal jugular vein [Bulb] (Head flexed): 16 mmHg    Left internal jugular vein [C4] (Neutral): 13 mmHg  Left internal jugular vein [C4] (Head right): 12 mmHg  Left internal jugular vein [C4] (Head left): 16 mmHg  Left internal jugular vein [C4] (Head flexed): 17 mmHg    Left internal jugular vein [C6] (Neutral): 13 mmHg  Left internal jugular vein [C6] (Head right): 11 mmHg  Left internal jugular vein [C6] (Head left): 16 mmHg  Left internal jugular vein [C6] (Head flexed): 13 mmHg    Left brachiocephalic vein pressure (Neutral): 10 mmHg  Right atrial pressure (Neutral): 9 mmHg    Blood pressure at time of right atrial manometry: 117/84 mmHg    The sheath was removed. A bandage was applied. There were no immediate  complications. Images were stored in PACS. The patient tolerated the  procedure well, and left the angiography suite in stable condition.    FINDINGS:    1. Sonographic evaluation of the right basilic vein confirms vessel  patency without thrombus. Image of needle entry sent to PACS.    2. Superior sagittal sinus venography demonstrates co-dominance in the  head-neutral position with bilateral collateral venous drainage via  sigmoid emissary veins into the vertebral and paravertebral veins.  Venography with the head rotated right demonstrates shift to  right-dominant drainage, with decrease in collateral venous drainage.  Venography with the head rotated left demonstrates co-dominant venous  drainage and collateral venous drainage via vertebral/paravertebral  venous collaterals from a left-sided sigmoid emissary vein.    3. Left transverse sinus venography demonstrates collateral venous  drainage via sigmoid emissary veins into the vertebral and  paravertebral veins in the  head-neutral position. Venography with the  head rotated right demonstrates resolution of collateral venous  opacification. Venography with the head rotated left demonstrates  collateral venous drainage via sigmoid emissary veins into the  vertebral and paravertebral veins.  _____________________  Impression: IMPRESSION:     Cerebral venography demonstrates bilateral collateral venous drainage  into vertebral/paravertebral collaterals via sigmoid emissary veins.  The etiology of this collateral venous flow is unclear, as no  hemodynamically significant venous compression is present. Bilateral  internal jugular venous compression is present on the referenced CT in  the flexed position at the level of the lateral mass of C1. This could  explain the findings on this exam, though no direct evidence of this  compression is present with either venography or manometry.     MONICA FU MD         SYSTEM ID:  [id number]      The longitudinal plan of care for the diagnosis(es)/condition(s) as documented were addressed during this visit. Due to the added complexity in care, I will continue to support Eloisa in the subsequent management and with ongoing continuity of care.    59-minutes were spent in evaluation, counseling, and documentation on the date of service.

## 2025-07-05 ENCOUNTER — PATIENT OUTREACH (OUTPATIENT)
Dept: CARE COORDINATION | Facility: CLINIC | Age: 20
End: 2025-07-05
Payer: COMMERCIAL

## 2025-07-07 ENCOUNTER — PATIENT OUTREACH (OUTPATIENT)
Dept: CARE COORDINATION | Facility: CLINIC | Age: 20
End: 2025-07-07
Payer: COMMERCIAL

## 2025-07-07 ENCOUNTER — CARE COORDINATION (OUTPATIENT)
Dept: NEUROLOGY | Facility: CLINIC | Age: 20
End: 2025-07-07
Payer: COMMERCIAL

## 2025-07-07 NOTE — PROGRESS NOTES
Referral to Dr. Louise, clinic note, and imaging reports sent to [email or web address]      Imaging requested to be sent via [employer].

## 2025-07-14 ENCOUNTER — TELEPHONE (OUTPATIENT)
Dept: NEUROLOGY | Facility: CLINIC | Age: 20
End: 2025-07-14
Payer: COMMERCIAL

## 2025-07-14 NOTE — TELEPHONE ENCOUNTER
Spoke to patient. I received confirmation on 7/7 that Dr. Louise's assistant received referral. I emailed Ashlie again for confirmation/if anything further is needed. Advised patient to reach out to pre-register with their clinic, then Ashlie should be reaching out to schedule.

## 2025-07-14 NOTE — TELEPHONE ENCOUNTER
[facility] Call Center    Phone Message    May a detailed message be left on voicemail: yes     Reason for Call: Other: Eloisa calling to request that her referral to Dr. De La Rosa be refaxed to [phone]. Amin also wanted to inform team that the number she was provided was incorrect and the correct phone number is [phone].     Amin is requesting a call to inform her that this has been sent.    Action Taken: Message routed to:  [facility] ([facility]    Travel Screening: Not Applicable     Date of Service:

## 2025-07-21 DIAGNOSIS — M75.41 IMPINGEMENT SYNDROME, SHOULDER, RIGHT: ICD-10-CM

## 2025-07-21 DIAGNOSIS — M35.7 HYPERMOBILITY SYNDROME: Primary | ICD-10-CM

## 2025-07-21 DIAGNOSIS — S43.004A DISLOCATION OF RIGHT SHOULDER JOINT, INITIAL ENCOUNTER: ICD-10-CM

## 2025-08-11 ENCOUNTER — TRANSCRIBE ORDERS (OUTPATIENT)
Dept: OTHER | Age: 20
End: 2025-08-11

## 2025-08-11 DIAGNOSIS — M35.7 HYPERMOBILITY SYNDROME: Primary | ICD-10-CM

## 2025-08-11 DIAGNOSIS — M53.2X2 CERVICAL SPINE INSTABILITY: ICD-10-CM

## 2025-08-11 DIAGNOSIS — G89.29 CHRONIC PAIN: ICD-10-CM

## 2025-09-12 PROBLEM — Z00.00 ENCOUNTER FOR PREVENTIVE HEALTH EXAMINATION: Status: ACTIVE | Noted: 2025-09-12

## 2025-09-16 ENCOUNTER — APPOINTMENT (OUTPATIENT)
Dept: NEUROSURGERY | Facility: CLINIC | Age: 20
End: 2025-09-16

## 2025-09-17 ENCOUNTER — TRANSCRIPTION ENCOUNTER (OUTPATIENT)
Age: 20
End: 2025-09-17

## (undated) RX ORDER — LIDOCAINE HYDROCHLORIDE 10 MG/ML
INJECTION, SOLUTION EPIDURAL; INFILTRATION; INTRACAUDAL; PERINEURAL
Status: DISPENSED
Start: 2023-02-15

## (undated) RX ORDER — SODIUM CHLORIDE 9 MG/ML
INJECTION, SOLUTION INTRAVENOUS
Status: DISPENSED
Start: 2023-02-15

## (undated) RX ORDER — LIDOCAINE HYDROCHLORIDE 10 MG/ML
INJECTION, SOLUTION EPIDURAL; INFILTRATION; INTRACAUDAL; PERINEURAL
Status: DISPENSED
Start: 2025-06-20

## (undated) RX ORDER — HEPARIN SODIUM 200 [USP'U]/100ML
INJECTION, SOLUTION INTRAVENOUS
Status: DISPENSED
Start: 2025-06-20

## (undated) RX ORDER — FENTANYL CITRATE 50 UG/ML
INJECTION, SOLUTION INTRAMUSCULAR; INTRAVENOUS
Status: DISPENSED
Start: 2023-02-15

## (undated) RX ORDER — PROTAMINE SULFATE 10 MG/ML
INJECTION, SOLUTION INTRAVENOUS
Status: DISPENSED
Start: 2023-02-15